# Patient Record
Sex: MALE | Race: WHITE | NOT HISPANIC OR LATINO | Employment: OTHER | URBAN - METROPOLITAN AREA
[De-identification: names, ages, dates, MRNs, and addresses within clinical notes are randomized per-mention and may not be internally consistent; named-entity substitution may affect disease eponyms.]

---

## 2020-01-25 ENCOUNTER — HOSPITAL ENCOUNTER (EMERGENCY)
Facility: HOSPITAL | Age: 60
Discharge: HOME | End: 2020-01-25
Attending: EMERGENCY MEDICINE
Payer: MEDICAID

## 2020-01-25 VITALS
HEART RATE: 97 BPM | HEIGHT: 74 IN | BODY MASS INDEX: 21.15 KG/M2 | TEMPERATURE: 98 F | DIASTOLIC BLOOD PRESSURE: 70 MMHG | OXYGEN SATURATION: 98 % | RESPIRATION RATE: 20 BRPM | SYSTOLIC BLOOD PRESSURE: 129 MMHG | WEIGHT: 164.81 LBS

## 2020-01-25 DIAGNOSIS — M54.50 ACUTE BILATERAL LOW BACK PAIN WITHOUT SCIATICA: Primary | ICD-10-CM

## 2020-01-25 LAB
HCV AB SERPL QL IA: NEGATIVE
HIV 1+2 AB+HIV1 P24 AG SERPL QL IA: NEGATIVE

## 2020-01-25 PROCEDURE — 86703 HIV-1/HIV-2 1 RESULT ANTBDY: CPT

## 2020-01-25 PROCEDURE — 86803 HEPATITIS C AB TEST: CPT

## 2020-01-25 PROCEDURE — 99284 EMERGENCY DEPT VISIT MOD MDM: CPT | Mod: 25

## 2020-01-25 NOTE — ED NOTES
Patient examined, evaluated, and educated on discharge instructions and prescriptions by BETTY Sánchez, without nursing assistance. Patient discharge to lobby by provider.

## 2020-01-25 NOTE — ED PROVIDER NOTES
"   History      Chief Complaint   Patient presents with    Back Pain     pt c/o back pain at "L5-S1, where the fusion was," x4 days       Review of patient's allergies indicates:   Allergen Reactions    Clindamycin Rash        HPI   HPI    1/25/2020, 3:05 PM   History obtained from the patient      History of Present Illness: Eloy Jean is a 59 y.o. male patient who presents to the Emergency Department for low back pain for 4 days.  Denies injury. Symptoms are constant and moderate in severity.  The patient describes the symptoms as achy.  Denies bladder/bowel dysfunction, fever, saddle anesthesia, or focal weakness.   No further complaints or concerns at this time.           PCP: Caterina Sorensen MD       Past Medical History:  No past medical history on file.      Past Surgical History:  No past surgical history on file.        Family History:  No family history on file.        Social History:  Social History     Tobacco Use    Smoking status: Not on file   Substance and Sexual Activity    Alcohol use: Not on file    Drug use: Not on file    Sexual activity: Not on file       ROS   Review of Systems   Constitutional: Negative for chills and fever.   HENT: Negative for sore throat.    Respiratory: Negative for shortness of breath.    Cardiovascular: Negative for chest pain.   Gastrointestinal: Negative for nausea and vomiting.   Genitourinary: Negative for decreased urine volume, difficulty urinating, dysuria and flank pain.   Musculoskeletal: Positive for back pain. Negative for neck stiffness.   Skin: Negative for rash and wound.   Neurological: Negative for weakness and numbness.   Hematological: Does not bruise/bleed easily.   All other systems reviewed and are negative.    Review of Systems    Physical Exam      Initial Vitals [01/25/20 1429]   BP Pulse Resp Temp SpO2   129/70 97 20 98.1 °F (36.7 °C) 98 %      MAP       --         Physical Exam  Vital signs and nursing notes " "reviewed.  Constitutional: Patient is in NAD. Awake and alert. Well-developed and well-nourished.  Head: Atraumatic. Normocephalic.  Eyes: PERRL. EOM intact. Conjunctivae nl. No scleral icterus.  ENT: Mucous membranes are moist. Oropharynx is clear.  Neck: Supple. No JVD. No lymphadenopathy.  No meningismus.  Nontender  Cardiovascular: Regular rate and rhythm. No murmurs, rubs, or gallops. Distal pulses are 2+ and symmetric.  Pulmonary/Chest: No respiratory distress. Clear to auscultation bilaterally. No wheezing, rales, or rhonchi.  Abdominal: Soft. Non-distended. No TTP. No rebound, guarding, or rigidity. Good bowel sounds.  Genitourinary: No CVA tenderness  Musculoskeletal: Moves all extremities. No edema.   Non tender c/t spine.  Lumbar spine with mild bilateral paraspinous ttp, no midline ttp or step.  Skin: Warm and dry.  Neurological: Awake and alert. No acute focal neurological deficits are appreciated.  5/5 x 4 strength.  Strong and equal foot plantar and dorsiflexion.  Psychiatric: Normal affect. Good eye contact. Appropriate in content.      ED Course      Procedures  ED Vital Signs:  Vitals:    01/25/20 1429   BP: 129/70   Pulse: 97   Resp: 20   Temp: 98.1 °F (36.7 °C)   TempSrc: Oral   SpO2: 98%   Weight: 74.7 kg (164 lb 12.7 oz)   Height: 6' 2" (1.88 m)                 Imaging Results:  Imaging Results          X-Ray Lumbar Spine Ap And Lateral (Final result)  Result time 01/25/20 14:48:29    Final result by Daron Winn MD (01/25/20 14:48:29)                 Impression:      No acute radiographic abnormality of the lumbar spine.    Postsurgical changes of an L5-S1 fusion.    L4-5 degenerative changes.      Electronically signed by: Daron Winn  Date:    01/25/2020  Time:    14:48             Narrative:    EXAMINATION:  XR LUMBAR SPINE AP AND LATERAL    CLINICAL HISTORY:  Low back pain, prior surgery, new or progressive sx;    TECHNIQUE:  AP, lateral and spot images were performed of the lumbar " spine.    COMPARISON:  None    FINDINGS:  Five non-rib-bearing lumbar type vertebral bodies.  Postsurgical changes of an L5-S1 posterior fusion.  Postoperative hardware appears intact.  Satisfactory lumbar alignment.  Lumbar vertebral body heights appear maintained.  Mild intervertebral disc height loss at L4-5.  Remaining intervertebral disc spaces appear maintained.  Mild L4-5 facet arthropathy.  No acute fracture.                                   The Emergency Provider reviewed the vital signs and test results, which are outlined above.    ED Discussion         Medication(s) given in the ER:  Medications - No data to display                New Prescriptions    No medications on file          Medical Decision Making        All findings were reviewed with the patient/family in detail.   All remaining questions and concerns were addressed at that time.  Patient/family has been counseled regarding the need for follow-up as well as the indication to return to the emergency room should new or worrisome developments occur.          MDM               Clinical Impression:        ICD-10-CM ICD-9-CM   1. Acute bilateral low back pain without sciatica M54.5 724.2     338.19               Princess Millan PA-C  01/25/20 1509

## 2022-10-10 ENCOUNTER — PATIENT MESSAGE (OUTPATIENT)
Dept: RESEARCH | Facility: HOSPITAL | Age: 62
End: 2022-10-10
Payer: MEDICAID

## 2025-05-01 ENCOUNTER — HOSPITAL ENCOUNTER (EMERGENCY)
Facility: HOSPITAL | Age: 65
Discharge: HOME | End: 2025-05-01
Attending: EMERGENCY MEDICINE
Payer: OTHER GOVERNMENT

## 2025-05-01 VITALS
HEART RATE: 70 BPM | BODY MASS INDEX: 23.05 KG/M2 | OXYGEN SATURATION: 100 % | WEIGHT: 179.63 LBS | RESPIRATION RATE: 18 BRPM | HEIGHT: 74 IN | DIASTOLIC BLOOD PRESSURE: 82 MMHG | SYSTOLIC BLOOD PRESSURE: 149 MMHG | TEMPERATURE: 98 F

## 2025-05-01 DIAGNOSIS — L03.116 CELLULITIS OF LEFT KNEE: Primary | ICD-10-CM

## 2025-05-01 DIAGNOSIS — M25.562 LEFT KNEE PAIN: ICD-10-CM

## 2025-05-01 LAB
ABSOLUTE EOSINOPHIL (OHS): 0.11 K/UL
ABSOLUTE MONOCYTE (OHS): 0.69 K/UL (ref 0.3–1)
ABSOLUTE NEUTROPHIL COUNT (OHS): 6.91 K/UL (ref 1.8–7.7)
ALBUMIN SERPL BCP-MCNC: 2.9 G/DL (ref 3.5–5.2)
ALP SERPL-CCNC: 89 UNIT/L (ref 40–150)
ALT SERPL W/O P-5'-P-CCNC: 16 UNIT/L (ref 10–44)
ANION GAP (OHS): 10 MMOL/L (ref 8–16)
AST SERPL-CCNC: 17 UNIT/L (ref 11–45)
BASOPHILS # BLD AUTO: 0.05 K/UL
BASOPHILS NFR BLD AUTO: 0.6 %
BILIRUB SERPL-MCNC: 0.4 MG/DL (ref 0.1–1)
BUN SERPL-MCNC: 17 MG/DL (ref 8–23)
CALCIUM SERPL-MCNC: 7.9 MG/DL (ref 8.7–10.5)
CHLORIDE SERPL-SCNC: 109 MMOL/L (ref 95–110)
CO2 SERPL-SCNC: 22 MMOL/L (ref 23–29)
CREAT SERPL-MCNC: 0.8 MG/DL (ref 0.5–1.4)
ERYTHROCYTE [DISTWIDTH] IN BLOOD BY AUTOMATED COUNT: 13.5 % (ref 11.5–14.5)
ERYTHROCYTE [SEDIMENTATION RATE] IN BLOOD: 12 MM/HR
GFR SERPLBLD CREATININE-BSD FMLA CKD-EPI: >60 ML/MIN/1.73/M2
GLUCOSE SERPL-MCNC: 106 MG/DL (ref 70–110)
HCT VFR BLD AUTO: 34.5 % (ref 40–54)
HGB BLD-MCNC: 11.5 GM/DL (ref 14–18)
IMM GRANULOCYTES # BLD AUTO: 0.03 K/UL (ref 0–0.04)
IMM GRANULOCYTES NFR BLD AUTO: 0.3 % (ref 0–0.5)
LYMPHOCYTES # BLD AUTO: 0.84 K/UL (ref 1–4.8)
MCH RBC QN AUTO: 29.6 PG (ref 27–31)
MCHC RBC AUTO-ENTMCNC: 33.3 G/DL (ref 32–36)
MCV RBC AUTO: 89 FL (ref 82–98)
NUCLEATED RBC (/100WBC) (OHS): 0 /100 WBC
PLATELET # BLD AUTO: 240 K/UL (ref 150–450)
PMV BLD AUTO: 9.2 FL (ref 9.2–12.9)
POTASSIUM SERPL-SCNC: 3.6 MMOL/L (ref 3.5–5.1)
PROT SERPL-MCNC: 5.5 GM/DL (ref 6–8.4)
RBC # BLD AUTO: 3.89 M/UL (ref 4.6–6.2)
RELATIVE EOSINOPHIL (OHS): 1.3 %
RELATIVE LYMPHOCYTE (OHS): 9.7 % (ref 18–48)
RELATIVE MONOCYTE (OHS): 8 % (ref 4–15)
RELATIVE NEUTROPHIL (OHS): 80.1 % (ref 38–73)
SODIUM SERPL-SCNC: 141 MMOL/L (ref 136–145)
WBC # BLD AUTO: 8.63 K/UL (ref 3.9–12.7)

## 2025-05-01 PROCEDURE — 80053 COMPREHEN METABOLIC PANEL: CPT | Performed by: EMERGENCY MEDICINE

## 2025-05-01 PROCEDURE — 99284 EMERGENCY DEPT VISIT MOD MDM: CPT | Mod: 25

## 2025-05-01 PROCEDURE — 90471 IMMUNIZATION ADMIN: CPT | Performed by: EMERGENCY MEDICINE

## 2025-05-01 PROCEDURE — 90715 TDAP VACCINE 7 YRS/> IM: CPT | Performed by: EMERGENCY MEDICINE

## 2025-05-01 PROCEDURE — 85652 RBC SED RATE AUTOMATED: CPT | Performed by: EMERGENCY MEDICINE

## 2025-05-01 PROCEDURE — 63600175 PHARM REV CODE 636 W HCPCS: Performed by: EMERGENCY MEDICINE

## 2025-05-01 PROCEDURE — 85025 COMPLETE CBC W/AUTO DIFF WBC: CPT | Performed by: EMERGENCY MEDICINE

## 2025-05-01 RX ORDER — DOXYCYCLINE 100 MG/1
100 CAPSULE ORAL 2 TIMES DAILY
Qty: 20 CAPSULE | Refills: 0 | Status: ON HOLD | OUTPATIENT
Start: 2025-05-01 | End: 2025-05-06 | Stop reason: HOSPADM

## 2025-05-01 RX ORDER — SULFAMETHOXAZOLE AND TRIMETHOPRIM 800; 160 MG/1; MG/1
1 TABLET ORAL 2 TIMES DAILY
Qty: 14 TABLET | Refills: 0 | Status: ON HOLD | OUTPATIENT
Start: 2025-05-01 | End: 2025-05-06 | Stop reason: HOSPADM

## 2025-05-01 RX ORDER — OXYCODONE AND ACETAMINOPHEN 5; 325 MG/1; MG/1
1 TABLET ORAL EVERY 6 HOURS PRN
Qty: 12 TABLET | Refills: 0 | Status: ON HOLD | OUTPATIENT
Start: 2025-05-01 | End: 2025-05-06

## 2025-05-01 RX ADMIN — CLOSTRIDIUM TETANI TOXOID ANTIGEN (FORMALDEHYDE INACTIVATED), CORYNEBACTERIUM DIPHTHERIAE TOXOID ANTIGEN (FORMALDEHYDE INACTIVATED), BORDETELLA PERTUSSIS TOXOID ANTIGEN (GLUTARALDEHYDE INACTIVATED), BORDETELLA PERTUSSIS FILAMENTOUS HEMAGGLUTININ ANTIGEN (FORMALDEHYDE INACTIVATED), BORDETELLA PERTUSSIS PERTACTIN ANTIGEN, AND BORDETELLA PERTUSSIS FIMBRIAE 2/3 ANTIGEN 0.5 ML: 5; 2; 2.5; 5; 3; 5 INJECTION, SUSPENSION INTRAMUSCULAR at 05:05
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
## 2025-05-03 ENCOUNTER — HOSPITAL ENCOUNTER (INPATIENT)
Facility: HOSPITAL | Age: 65
LOS: 3 days | Discharge: HOME | DRG: 501 | End: 2025-05-06
Attending: FAMILY MEDICINE | Admitting: STUDENT IN AN ORGANIZED HEALTH CARE EDUCATION/TRAINING PROGRAM
Payer: MEDICARE

## 2025-05-03 ENCOUNTER — ANESTHESIA (OUTPATIENT)
Dept: SURGERY | Facility: HOSPITAL | Age: 65
End: 2025-05-03
Payer: MEDICARE

## 2025-05-03 ENCOUNTER — ANESTHESIA EVENT (OUTPATIENT)
Dept: SURGERY | Facility: HOSPITAL | Age: 65
End: 2025-05-03
Payer: MEDICARE

## 2025-05-03 DIAGNOSIS — Z13.6 SCREENING FOR CARDIOVASCULAR CONDITION: ICD-10-CM

## 2025-05-03 DIAGNOSIS — M25.562 LEFT KNEE PAIN: ICD-10-CM

## 2025-05-03 DIAGNOSIS — R07.9 CHEST PAIN: ICD-10-CM

## 2025-05-03 DIAGNOSIS — L03.116 CELLULITIS OF LEFT KNEE: ICD-10-CM

## 2025-05-03 DIAGNOSIS — M70.42 PREPATELLAR BURSITIS OF LEFT KNEE: Primary | ICD-10-CM

## 2025-05-03 DIAGNOSIS — M86.9: ICD-10-CM

## 2025-05-03 PROBLEM — C67.5: Status: ACTIVE | Noted: 2019-09-18

## 2025-05-03 PROBLEM — M43.00 SPONDYLOLYSIS: Status: ACTIVE | Noted: 2025-05-03

## 2025-05-03 PROBLEM — C67.8 MALIGNANT NEOPLASM OF OVERLAPPING SITES OF BLADDER: Status: ACTIVE | Noted: 2022-01-28

## 2025-05-03 PROBLEM — M54.50 CHRONIC LEFT-SIDED LOW BACK PAIN WITHOUT SCIATICA: Status: ACTIVE | Noted: 2019-09-30

## 2025-05-03 PROBLEM — G89.29 CHRONIC LEFT-SIDED LOW BACK PAIN WITHOUT SCIATICA: Status: ACTIVE | Noted: 2019-09-30

## 2025-05-03 PROBLEM — Z78.9 VEGAN DIET: Status: ACTIVE | Noted: 2019-09-30

## 2025-05-03 PROBLEM — Z87.39 HISTORY OF AVASCULAR NECROSIS OF CAPITAL FEMORAL EPIPHYSIS: Status: ACTIVE | Noted: 2019-09-30

## 2025-05-03 PROBLEM — L40.9 PSORIASIS: Status: ACTIVE | Noted: 2020-04-30

## 2025-05-03 PROBLEM — E78.00 HYPERCHOLESTEROLEMIA: Status: ACTIVE | Noted: 2021-10-12

## 2025-05-03 PROBLEM — E03.9 HYPOTHYROID: Status: ACTIVE | Noted: 2025-05-03

## 2025-05-03 PROBLEM — F31.9 BIPOLAR 1 DISORDER: Status: ACTIVE | Noted: 2019-09-30

## 2025-05-03 PROBLEM — M00.9 ARTHRITIS, SEPTIC, KNEE: Status: ACTIVE | Noted: 2025-05-03

## 2025-05-03 PROBLEM — L21.9 SEBORRHEIC DERMATITIS: Status: ACTIVE | Noted: 2020-06-19

## 2025-05-03 PROBLEM — E78.41 ELEVATED LIPOPROTEIN(A): Status: ACTIVE | Noted: 2019-09-30

## 2025-05-03 PROBLEM — E55.9 HYPOVITAMINOSIS D: Status: ACTIVE | Noted: 2023-01-31

## 2025-05-03 PROBLEM — L73.9 FOLLICULITIS: Status: ACTIVE | Noted: 2022-03-22

## 2025-05-03 PROBLEM — R53.82 CHRONIC FATIGUE: Status: ACTIVE | Noted: 2019-09-30

## 2025-05-03 PROBLEM — R00.1 SINUS BRADYCARDIA BY ELECTROCARDIOGRAM: Status: ACTIVE | Noted: 2019-11-13

## 2025-05-03 LAB
ABSOLUTE EOSINOPHIL (OHS): 0.04 K/UL
ABSOLUTE MONOCYTE (OHS): 0.86 K/UL (ref 0.3–1)
ABSOLUTE NEUTROPHIL COUNT (OHS): 7.11 K/UL (ref 1.8–7.7)
ALBUMIN SERPL BCP-MCNC: 3.1 G/DL (ref 3.5–5.2)
ALP SERPL-CCNC: 106 UNIT/L (ref 40–150)
ALT SERPL W/O P-5'-P-CCNC: 17 UNIT/L (ref 10–44)
ANION GAP (OHS): 9 MMOL/L (ref 8–16)
AST SERPL-CCNC: 19 UNIT/L (ref 11–45)
BASOPHILS # BLD AUTO: 0.04 K/UL
BASOPHILS NFR BLD AUTO: 0.5 %
BILIRUB SERPL-MCNC: 0.3 MG/DL (ref 0.1–1)
BILIRUB UR QL STRIP.AUTO: NEGATIVE
BUN SERPL-MCNC: 19 MG/DL (ref 8–23)
CALCIUM SERPL-MCNC: 8.8 MG/DL (ref 8.7–10.5)
CHLORIDE SERPL-SCNC: 108 MMOL/L (ref 95–110)
CLARITY UR: ABNORMAL
CO2 SERPL-SCNC: 25 MMOL/L (ref 23–29)
COLOR UR AUTO: YELLOW
CREAT SERPL-MCNC: 1.1 MG/DL (ref 0.5–1.4)
ERYTHROCYTE [DISTWIDTH] IN BLOOD BY AUTOMATED COUNT: 13.5 % (ref 11.5–14.5)
GFR SERPLBLD CREATININE-BSD FMLA CKD-EPI: >60 ML/MIN/1.73/M2
GLUCOSE SERPL-MCNC: 87 MG/DL (ref 70–110)
GLUCOSE UR QL STRIP: NEGATIVE
HCT VFR BLD AUTO: 37.3 % (ref 40–54)
HCV AB SERPL QL IA: NEGATIVE
HGB BLD-MCNC: 12 GM/DL (ref 14–18)
HGB UR QL STRIP: NEGATIVE
HIV 1+2 AB+HIV1 P24 AG SERPL QL IA: NEGATIVE
HOLD SPECIMEN: NORMAL
IMM GRANULOCYTES # BLD AUTO: 0.02 K/UL (ref 0–0.04)
IMM GRANULOCYTES NFR BLD AUTO: 0.2 % (ref 0–0.5)
KETONES UR QL STRIP: NEGATIVE
LACTATE SERPL-SCNC: 0.8 MMOL/L (ref 0.5–2.2)
LACTATE SERPL-SCNC: 1.7 MMOL/L (ref 0.5–2.2)
LEUKOCYTE ESTERASE UR QL STRIP: NEGATIVE
LYMPHOCYTES # BLD AUTO: 0.78 K/UL (ref 1–4.8)
MCH RBC QN AUTO: 28.6 PG (ref 27–31)
MCHC RBC AUTO-ENTMCNC: 32.2 G/DL (ref 32–36)
MCV RBC AUTO: 89 FL (ref 82–98)
NITRITE UR QL STRIP: NEGATIVE
NUCLEATED RBC (/100WBC) (OHS): 0 /100 WBC
OHS QRS DURATION: 76 MS
OHS QTC CALCULATION: 418 MS
PH UR STRIP: 7 [PH]
PLATELET # BLD AUTO: 289 K/UL (ref 150–450)
PMV BLD AUTO: 9 FL (ref 9.2–12.9)
POTASSIUM SERPL-SCNC: 3.5 MMOL/L (ref 3.5–5.1)
PROCALCITONIN SERPL-MCNC: 0.07 NG/ML
PROT SERPL-MCNC: 6.1 GM/DL (ref 6–8.4)
PROT UR QL STRIP: NEGATIVE
RBC # BLD AUTO: 4.19 M/UL (ref 4.6–6.2)
RELATIVE EOSINOPHIL (OHS): 0.5 %
RELATIVE LYMPHOCYTE (OHS): 8.8 % (ref 18–48)
RELATIVE MONOCYTE (OHS): 9.7 % (ref 4–15)
RELATIVE NEUTROPHIL (OHS): 80.3 % (ref 38–73)
SODIUM SERPL-SCNC: 142 MMOL/L (ref 136–145)
SP GR UR STRIP: 1.02
TSH SERPL-ACNC: 1.14 UIU/ML (ref 0.4–4)
UROBILINOGEN UR STRIP-ACNC: NEGATIVE EU/DL
WBC # BLD AUTO: 8.85 K/UL (ref 3.9–12.7)

## 2025-05-03 PROCEDURE — 37000009 HC ANESTHESIA EA ADD 15 MINS: Performed by: ORTHOPAEDIC SURGERY

## 2025-05-03 PROCEDURE — 87116 MYCOBACTERIA CULTURE: CPT | Performed by: ORTHOPAEDIC SURGERY

## 2025-05-03 PROCEDURE — 87206 SMEAR FLUORESCENT/ACID STAI: CPT | Performed by: ORTHOPAEDIC SURGERY

## 2025-05-03 PROCEDURE — 25000003 PHARM REV CODE 250: Performed by: FAMILY MEDICINE

## 2025-05-03 PROCEDURE — 21400001 HC TELEMETRY ROOM

## 2025-05-03 PROCEDURE — 84443 ASSAY THYROID STIM HORMONE: CPT | Performed by: NURSE PRACTITIONER

## 2025-05-03 PROCEDURE — 25000003 PHARM REV CODE 250: Performed by: STUDENT IN AN ORGANIZED HEALTH CARE EDUCATION/TRAINING PROGRAM

## 2025-05-03 PROCEDURE — 94761 N-INVAS EAR/PLS OXIMETRY MLT: CPT

## 2025-05-03 PROCEDURE — 36000707: Performed by: ORTHOPAEDIC SURGERY

## 2025-05-03 PROCEDURE — 87205 SMEAR GRAM STAIN: CPT | Performed by: ORTHOPAEDIC SURGERY

## 2025-05-03 PROCEDURE — 93010 ELECTROCARDIOGRAM REPORT: CPT | Mod: ,,, | Performed by: INTERNAL MEDICINE

## 2025-05-03 PROCEDURE — 27200651 HC AIRWAY, LMA: Performed by: ANESTHESIOLOGY

## 2025-05-03 PROCEDURE — 83605 ASSAY OF LACTIC ACID: CPT | Performed by: STUDENT IN AN ORGANIZED HEALTH CARE EDUCATION/TRAINING PROGRAM

## 2025-05-03 PROCEDURE — 87040 BLOOD CULTURE FOR BACTERIA: CPT | Performed by: ORTHOPAEDIC SURGERY

## 2025-05-03 PROCEDURE — 99285 EMERGENCY DEPT VISIT HI MDM: CPT | Mod: 25

## 2025-05-03 PROCEDURE — 25000003 PHARM REV CODE 250: Performed by: NURSE PRACTITIONER

## 2025-05-03 PROCEDURE — 84145 PROCALCITONIN (PCT): CPT | Performed by: FAMILY MEDICINE

## 2025-05-03 PROCEDURE — 63600175 PHARM REV CODE 636 W HCPCS: Performed by: ANESTHESIOLOGY

## 2025-05-03 PROCEDURE — 86803 HEPATITIS C AB TEST: CPT | Performed by: EMERGENCY MEDICINE

## 2025-05-03 PROCEDURE — 83605 ASSAY OF LACTIC ACID: CPT | Performed by: FAMILY MEDICINE

## 2025-05-03 PROCEDURE — 63600175 PHARM REV CODE 636 W HCPCS: Performed by: NURSE ANESTHETIST, CERTIFIED REGISTERED

## 2025-05-03 PROCEDURE — 81003 URINALYSIS AUTO W/O SCOPE: CPT | Performed by: FAMILY MEDICINE

## 2025-05-03 PROCEDURE — 63600175 PHARM REV CODE 636 W HCPCS: Performed by: STUDENT IN AN ORGANIZED HEALTH CARE EDUCATION/TRAINING PROGRAM

## 2025-05-03 PROCEDURE — 63600175 PHARM REV CODE 636 W HCPCS: Performed by: ORTHOPAEDIC SURGERY

## 2025-05-03 PROCEDURE — 27340 REMOVAL OF KNEECAP BURSA: CPT | Mod: LT,,, | Performed by: ORTHOPAEDIC SURGERY

## 2025-05-03 PROCEDURE — 87389 HIV-1 AG W/HIV-1&-2 AB AG IA: CPT | Performed by: EMERGENCY MEDICINE

## 2025-05-03 PROCEDURE — 63600175 PHARM REV CODE 636 W HCPCS: Performed by: FAMILY MEDICINE

## 2025-05-03 PROCEDURE — 96360 HYDRATION IV INFUSION INIT: CPT

## 2025-05-03 PROCEDURE — 87102 FUNGUS ISOLATION CULTURE: CPT | Performed by: ORTHOPAEDIC SURGERY

## 2025-05-03 PROCEDURE — 20610 DRAIN/INJ JOINT/BURSA W/O US: CPT | Mod: XS,LT,, | Performed by: ORTHOPAEDIC SURGERY

## 2025-05-03 PROCEDURE — 36415 COLL VENOUS BLD VENIPUNCTURE: CPT | Performed by: STUDENT IN AN ORGANIZED HEALTH CARE EDUCATION/TRAINING PROGRAM

## 2025-05-03 PROCEDURE — 87075 CULTR BACTERIA EXCEPT BLOOD: CPT | Performed by: ORTHOPAEDIC SURGERY

## 2025-05-03 PROCEDURE — 37000008 HC ANESTHESIA 1ST 15 MINUTES: Performed by: ORTHOPAEDIC SURGERY

## 2025-05-03 PROCEDURE — 0S9D3ZX DRAINAGE OF LEFT KNEE JOINT, PERCUTANEOUS APPROACH, DIAGNOSTIC: ICD-10-PCS | Performed by: ORTHOPAEDIC SURGERY

## 2025-05-03 PROCEDURE — 93005 ELECTROCARDIOGRAM TRACING: CPT

## 2025-05-03 PROCEDURE — 82040 ASSAY OF SERUM ALBUMIN: CPT | Performed by: FAMILY MEDICINE

## 2025-05-03 PROCEDURE — 71000033 HC RECOVERY, INTIAL HOUR: Performed by: ORTHOPAEDIC SURGERY

## 2025-05-03 PROCEDURE — 0MBP0ZZ EXCISION OF LEFT KNEE BURSA AND LIGAMENT, OPEN APPROACH: ICD-10-PCS | Performed by: ORTHOPAEDIC SURGERY

## 2025-05-03 PROCEDURE — 85025 COMPLETE CBC W/AUTO DIFF WBC: CPT | Performed by: FAMILY MEDICINE

## 2025-05-03 PROCEDURE — 25000003 PHARM REV CODE 250: Performed by: ORTHOPAEDIC SURGERY

## 2025-05-03 PROCEDURE — 36000706: Performed by: ORTHOPAEDIC SURGERY

## 2025-05-03 PROCEDURE — 87070 CULTURE OTHR SPECIMN AEROBIC: CPT | Performed by: ORTHOPAEDIC SURGERY

## 2025-05-03 RX ORDER — ONDANSETRON HYDROCHLORIDE 2 MG/ML
4 INJECTION, SOLUTION INTRAVENOUS DAILY PRN
Status: DISCONTINUED | OUTPATIENT
Start: 2025-05-03 | End: 2025-05-06 | Stop reason: HOSPADM

## 2025-05-03 RX ORDER — GLUCAGON 1 MG
1 KIT INJECTION
Status: DISCONTINUED | OUTPATIENT
Start: 2025-05-03 | End: 2025-05-06 | Stop reason: HOSPADM

## 2025-05-03 RX ORDER — CHLORHEXIDINE GLUCONATE ORAL RINSE 1.2 MG/ML
10 SOLUTION DENTAL 2 TIMES DAILY
Status: DISCONTINUED | OUTPATIENT
Start: 2025-05-03 | End: 2025-05-06 | Stop reason: HOSPADM

## 2025-05-03 RX ORDER — DIAZEPAM 10 MG/1
5-10 TABLET ORAL NIGHTLY PRN
COMMUNITY
Start: 2025-02-23

## 2025-05-03 RX ORDER — POLYETHYLENE GLYCOL 3350 17 G/17G
17 POWDER, FOR SOLUTION ORAL DAILY
Status: DISCONTINUED | OUTPATIENT
Start: 2025-05-04 | End: 2025-05-06 | Stop reason: HOSPADM

## 2025-05-03 RX ORDER — DIAZEPAM 5 MG/1
5 TABLET ORAL 2 TIMES DAILY PRN
Status: DISCONTINUED | OUTPATIENT
Start: 2025-05-03 | End: 2025-05-06 | Stop reason: HOSPADM

## 2025-05-03 RX ORDER — CEFEPIME HYDROCHLORIDE 1 G/1
1 INJECTION, POWDER, FOR SOLUTION INTRAMUSCULAR; INTRAVENOUS
Status: DISCONTINUED | OUTPATIENT
Start: 2025-05-03 | End: 2025-05-05

## 2025-05-03 RX ORDER — TAMSULOSIN HYDROCHLORIDE 0.4 MG/1
CAPSULE ORAL
COMMUNITY

## 2025-05-03 RX ORDER — SODIUM CHLORIDE, SODIUM LACTATE, POTASSIUM CHLORIDE, CALCIUM CHLORIDE 600; 310; 30; 20 MG/100ML; MG/100ML; MG/100ML; MG/100ML
INJECTION, SOLUTION INTRAVENOUS CONTINUOUS PRN
Status: DISCONTINUED | OUTPATIENT
Start: 2025-05-03 | End: 2025-05-03

## 2025-05-03 RX ORDER — HYOSCYAMINE SULFATE 0.12 MG/1
0.12 TABLET SUBLINGUAL EVERY 6 HOURS
COMMUNITY
Start: 2024-12-06 | End: 2025-05-03

## 2025-05-03 RX ORDER — FENTANYL CITRATE 50 UG/ML
25 INJECTION, SOLUTION INTRAMUSCULAR; INTRAVENOUS EVERY 5 MIN PRN
Status: DISCONTINUED | OUTPATIENT
Start: 2025-05-03 | End: 2025-05-03 | Stop reason: HOSPADM

## 2025-05-03 RX ORDER — HYDRALAZINE HYDROCHLORIDE 20 MG/ML
10 INJECTION INTRAMUSCULAR; INTRAVENOUS ONCE
Status: DISCONTINUED | OUTPATIENT
Start: 2025-05-03 | End: 2025-05-06 | Stop reason: HOSPADM

## 2025-05-03 RX ORDER — HYDROMORPHONE HYDROCHLORIDE 2 MG/ML
0.2 INJECTION, SOLUTION INTRAMUSCULAR; INTRAVENOUS; SUBCUTANEOUS EVERY 5 MIN PRN
Status: DISCONTINUED | OUTPATIENT
Start: 2025-05-03 | End: 2025-05-06 | Stop reason: HOSPADM

## 2025-05-03 RX ORDER — HYDROCODONE BITARTRATE AND ACETAMINOPHEN 10; 325 MG/1; MG/1
TABLET ORAL
COMMUNITY
End: 2025-05-03

## 2025-05-03 RX ORDER — MIDAZOLAM HYDROCHLORIDE 1 MG/ML
INJECTION INTRAMUSCULAR; INTRAVENOUS
Status: DISCONTINUED | OUTPATIENT
Start: 2025-05-03 | End: 2025-05-03

## 2025-05-03 RX ORDER — ONDANSETRON HYDROCHLORIDE 2 MG/ML
INJECTION, SOLUTION INTRAVENOUS
Status: DISCONTINUED | OUTPATIENT
Start: 2025-05-03 | End: 2025-05-03

## 2025-05-03 RX ORDER — DEXTROAMPHETAMINE SACCHARATE, AMPHETAMINE ASPARTATE, DEXTROAMPHETAMINE SULFATE AND AMPHETAMINE SULFATE 5; 5; 5; 5 MG/1; MG/1; MG/1; MG/1
TABLET ORAL
COMMUNITY
Start: 2025-03-04

## 2025-05-03 RX ORDER — LIDOCAINE HYDROCHLORIDE 10 MG/ML
INJECTION, SOLUTION EPIDURAL; INFILTRATION; INTRACAUDAL; PERINEURAL
Status: DISCONTINUED | OUTPATIENT
Start: 2025-05-03 | End: 2025-05-03

## 2025-05-03 RX ORDER — SODIUM CHLORIDE 0.9 % (FLUSH) 0.9 %
10 SYRINGE (ML) INJECTION
Status: DISCONTINUED | OUTPATIENT
Start: 2025-05-03 | End: 2025-05-06 | Stop reason: HOSPADM

## 2025-05-03 RX ORDER — CEFEPIME HYDROCHLORIDE 1 G/1
1 INJECTION, POWDER, FOR SOLUTION INTRAMUSCULAR; INTRAVENOUS
Status: DISCONTINUED | OUTPATIENT
Start: 2025-05-03 | End: 2025-05-03

## 2025-05-03 RX ORDER — LITHIUM CARBONATE 300 MG
600 TABLET ORAL 2 TIMES DAILY
COMMUNITY
Start: 2024-11-11

## 2025-05-03 RX ORDER — SODIUM CHLORIDE 0.9 % (FLUSH) 0.9 %
10 SYRINGE (ML) INJECTION EVERY 12 HOURS PRN
Status: DISCONTINUED | OUTPATIENT
Start: 2025-05-03 | End: 2025-05-06 | Stop reason: HOSPADM

## 2025-05-03 RX ORDER — IBUPROFEN 200 MG
24 TABLET ORAL
Status: DISCONTINUED | OUTPATIENT
Start: 2025-05-03 | End: 2025-05-06 | Stop reason: HOSPADM

## 2025-05-03 RX ORDER — PROCHLORPERAZINE EDISYLATE 5 MG/ML
5 INJECTION INTRAMUSCULAR; INTRAVENOUS EVERY 6 HOURS PRN
Status: DISCONTINUED | OUTPATIENT
Start: 2025-05-03 | End: 2025-05-06 | Stop reason: HOSPADM

## 2025-05-03 RX ORDER — ONDANSETRON HYDROCHLORIDE 2 MG/ML
4 INJECTION, SOLUTION INTRAVENOUS EVERY 8 HOURS PRN
Status: DISCONTINUED | OUTPATIENT
Start: 2025-05-03 | End: 2025-05-06 | Stop reason: HOSPADM

## 2025-05-03 RX ORDER — OXYCODONE AND ACETAMINOPHEN 5; 325 MG/1; MG/1
1 TABLET ORAL EVERY 4 HOURS PRN
Refills: 0 | Status: DISCONTINUED | OUTPATIENT
Start: 2025-05-03 | End: 2025-05-06 | Stop reason: HOSPADM

## 2025-05-03 RX ORDER — TAMSULOSIN HYDROCHLORIDE 0.4 MG/1
0.4 CAPSULE ORAL DAILY
Status: DISCONTINUED | OUTPATIENT
Start: 2025-05-04 | End: 2025-05-06 | Stop reason: HOSPADM

## 2025-05-03 RX ORDER — ALUMINUM HYDROXIDE, MAGNESIUM HYDROXIDE, AND SIMETHICONE 1200; 120; 1200 MG/30ML; MG/30ML; MG/30ML
30 SUSPENSION ORAL 4 TIMES DAILY PRN
Status: DISCONTINUED | OUTPATIENT
Start: 2025-05-03 | End: 2025-05-06 | Stop reason: HOSPADM

## 2025-05-03 RX ORDER — FENTANYL CITRATE 50 UG/ML
INJECTION, SOLUTION INTRAMUSCULAR; INTRAVENOUS
Status: DISCONTINUED | OUTPATIENT
Start: 2025-05-03 | End: 2025-05-03

## 2025-05-03 RX ORDER — NALOXONE HCL 0.4 MG/ML
0.02 VIAL (ML) INJECTION
Status: DISCONTINUED | OUTPATIENT
Start: 2025-05-03 | End: 2025-05-06 | Stop reason: HOSPADM

## 2025-05-03 RX ORDER — TALC
6 POWDER (GRAM) TOPICAL NIGHTLY PRN
Status: DISCONTINUED | OUTPATIENT
Start: 2025-05-03 | End: 2025-05-03

## 2025-05-03 RX ORDER — PROPOFOL 10 MG/ML
VIAL (ML) INTRAVENOUS
Status: DISCONTINUED | OUTPATIENT
Start: 2025-05-03 | End: 2025-05-03

## 2025-05-03 RX ORDER — ACETAMINOPHEN 325 MG/1
650 TABLET ORAL EVERY 4 HOURS PRN
Status: DISCONTINUED | OUTPATIENT
Start: 2025-05-03 | End: 2025-05-06 | Stop reason: HOSPADM

## 2025-05-03 RX ORDER — DIPHENHYDRAMINE HYDROCHLORIDE 50 MG/ML
25 INJECTION, SOLUTION INTRAMUSCULAR; INTRAVENOUS EVERY 6 HOURS PRN
Status: DISCONTINUED | OUTPATIENT
Start: 2025-05-03 | End: 2025-05-06 | Stop reason: HOSPADM

## 2025-05-03 RX ORDER — IBUPROFEN 200 MG
16 TABLET ORAL
Status: DISCONTINUED | OUTPATIENT
Start: 2025-05-03 | End: 2025-05-06 | Stop reason: HOSPADM

## 2025-05-03 RX ADMIN — LIDOCAINE HYDROCHLORIDE 50 MG: 10 SOLUTION INTRAVENOUS at 05:05

## 2025-05-03 RX ADMIN — HYDROMORPHONE HYDROCHLORIDE 0.2 MG: 2 INJECTION INTRAMUSCULAR; INTRAVENOUS; SUBCUTANEOUS at 06:05

## 2025-05-03 RX ADMIN — HYDROMORPHONE HYDROCHLORIDE 0.2 MG: 2 INJECTION INTRAMUSCULAR; INTRAVENOUS; SUBCUTANEOUS at 07:05

## 2025-05-03 RX ADMIN — CHLORHEXIDINE GLUCONATE 0.12% ORAL RINSE 10 ML: 1.2 LIQUID ORAL at 09:05

## 2025-05-03 RX ADMIN — OXYCODONE HYDROCHLORIDE AND ACETAMINOPHEN 1 TABLET: 5; 325 TABLET ORAL at 06:05

## 2025-05-03 RX ADMIN — VANCOMYCIN HYDROCHLORIDE 1500 MG: 1.5 INJECTION, POWDER, LYOPHILIZED, FOR SOLUTION INTRAVENOUS at 03:05

## 2025-05-03 RX ADMIN — SODIUM CHLORIDE, SODIUM LACTATE, POTASSIUM CHLORIDE, AND CALCIUM CHLORIDE: 600; 310; 30; 20 INJECTION, SOLUTION INTRAVENOUS at 05:05

## 2025-05-03 RX ADMIN — FENTANYL CITRATE 100 MCG: 50 INJECTION, SOLUTION INTRAMUSCULAR; INTRAVENOUS at 05:05

## 2025-05-03 RX ADMIN — PROPOFOL 80 MG: 10 INJECTION, EMULSION INTRAVENOUS at 05:05

## 2025-05-03 RX ADMIN — CEFEPIME 1 G: 1 INJECTION, POWDER, FOR SOLUTION INTRAMUSCULAR; INTRAVENOUS at 03:05

## 2025-05-03 RX ADMIN — PIPERACILLIN AND TAZOBACTAM 4.5 G: 4; .5 INJECTION, POWDER, LYOPHILIZED, FOR SOLUTION INTRAVENOUS; PARENTERAL at 12:05

## 2025-05-03 RX ADMIN — MIDAZOLAM HYDROCHLORIDE 2 MG: 1 INJECTION, SOLUTION INTRAMUSCULAR; INTRAVENOUS at 05:05

## 2025-05-03 RX ADMIN — SODIUM CHLORIDE 1000 ML: 9 INJECTION, SOLUTION INTRAVENOUS at 10:05

## 2025-05-03 RX ADMIN — CEFEPIME 1 G: 1 INJECTION, POWDER, FOR SOLUTION INTRAMUSCULAR; INTRAVENOUS at 09:05

## 2025-05-03 RX ADMIN — ONDANSETRON 4 MG: 2 INJECTION INTRAMUSCULAR; INTRAVENOUS at 05:05

## 2025-05-03 NOTE — NURSING TRANSFER
"  Nursing Transfer Note      5/3/2025   1:26 PM    Nurse giving handoff:Elvia   Nurse receiving handoff:crystral     Reason patient is being transferred: observation for septic L knee/ cellulitis     Transfer From: ED    Transfer via stretcher    Transfer with cardiac monitoring    Transported by elvia    Transfer Vital Signs:  /82   Pulse 76   Temp 98 °F (36.7 °C)   Resp 18   Ht 6' 2" (1.88 m)   Wt 80 kg (176 lb 5.9 oz)   SpO2 96%   BMI 22.64 kg/m²       Telemetry: Box Number [id number]  Order for Tele Monitor? Yes     patient belongings transferred with patient: Yes helmet and clothes, lap top and cell phone    Chart send with patient: Yes      Upon arrival to floor: cardiac monitor applied, patient oriented to room, call bell in reach, and bed in lowest position. Bed alarm refused   "
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
## 2025-05-03 NOTE — ASSESSMENT & PLAN NOTE
Follows with psychiatry  Managed with lithium, patient reports does not take this medication every day.    --continue Valium p.r.n.

## 2025-05-03 NOTE — ASSESSMENT & PLAN NOTE
Previous history of hypothyroid, treated with levothyroxine.  Patient reports no longer taking this medication.    --TSH
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
## 2025-05-03 NOTE — PROGRESS NOTES
Pharmacist Renal Dose Adjustment Note    Eloy Jean is a 65 y.o. male being treated with the medication Cefepime    Patient Data:    Vital Signs (Most Recent):  Temp: 98 °F (36.7 °C) (05/03/25 1307)  Pulse: 76 (05/03/25 1307)  Resp: 18 (05/03/25 1307)  BP: 132/82 (05/03/25 1307)  SpO2: 96 % (05/03/25 1307) Vital Signs (72h Range):  Temp:  [98 °F (36.7 °C)-99 °F (37.2 °C)]   Pulse:  []   Resp:  [18-20]   BP: (130-153)/(63-82)   SpO2:  [96 %-100 %]      Recent Labs   Lab 05/01/25  0506 05/03/25  1054   CREATININE 0.8 1.1     Serum creatinine: 1.1 mg/dL 05/03/25 1054  Estimated creatinine clearance: 75.8 mL/min    Medication:cefepime dose: 1 gram frequency every 12 hours will be changed to medication:cefepime dose:1 gram frequency:every 6 hours    Pharmacist's Name: Radha Andrade  Pharmacist's Extension: [phone]

## 2025-05-03 NOTE — PROGRESS NOTES
"Pharmacokinetic Initial Assessment: IV Vancomycin    Assessment/Plan:    Initiate intravenous vancomycin with loading dose of 1500 mg once with subsequent doses when random concentrations are less than 20 mcg/mL  Desired empiric serum trough concentration is 15 to 20 mcg/mL  Draw vancomycin random level on 5/4 at 0600.  Pharmacy will continue to follow and monitor vancomycin.      Please contact pharmacy at extension [phone]  with any questions regarding this assessment.     Thank you for the consult,   Radha Andrade       Patient brief summary:  Eloy Jean is a 65 y.o. male initiated on antimicrobial therapy with IV Vancomycin for treatment of suspected bone/joint infection    Drug Allergies:   Review of patient's allergies indicates:   Allergen Reactions    Clindamycin Rash       Actual Body Weight:   80 kg    Renal Function:   Estimated Creatinine Clearance: 75.8 mL/min (based on SCr of 1.1 mg/dL).,     Dialysis Method (if applicable):  N/A    CBC (last 72 hours):  Recent Labs   Lab Result Units 05/01/25  0506 05/03/25  1054   WBC K/uL 8.63 8.85   HGB gm/dL 11.5* 12.0*   HCT % 34.5* 37.3*   Platelet Count K/uL 240 289   Lymph % % 9.7* 8.8*   Mono % % 8.0 9.7   Eos % % 1.3 0.5   Basophil % % 0.6 0.5       Metabolic Panel (last 72 hours):  Recent Labs   Lab Result Units 05/01/25  0506 05/03/25  1054 05/03/25  1349   Sodium mmol/L 141 142  --    Potassium mmol/L 3.6 3.5  --    Chloride mmol/L 109 108  --    CO2 mmol/L 22* 25  --    Glucose mg/dL 106 87  --    Glucose, UA   --   --  Negative   BUN mg/dL 17 19  --    Creatinine mg/dL 0.8 1.1  --    Albumin g/dL 2.9* 3.1*  --    Bilirubin Total mg/dL 0.4 0.3  --    ALP unit/L 89 106  --    AST unit/L 17 19  --    ALT unit/L 16 17  --        Drug levels (last 3 results):  No results for input(s): "VANCOMYCINRA", "VANCORANDOM", "VANCOMYCINPE", "VANCOPEAK", "VANCOMYCINTR", "VANCOTROUGH" in the last 72 hours.    Microbiologic Results:  Microbiology Results (last 7 " days)       Procedure Component Value Units Date/Time    Blood culture x two cultures. Draw prior to antibiotics. [[id number]] Collected: 05/03/25 1054    Order Status: Sent Specimen: Blood from Peripheral, Antecubital, Left Updated: 05/03/25 1643    Blood culture x two cultures. Draw prior to antibiotics. [[id number]] Collected: 05/03/25 1057    Order Status: Sent Specimen: Blood from Peripheral, Hand, Right Updated: 05/03/25 1112

## 2025-05-03 NOTE — TRANSFER OF CARE
"Anesthesia Transfer of Care Note    Patient: Eloy Jean    Procedure(s) Performed: Procedure(s) (LRB):  IRRIGATION AND DEBRIDEMENT, LOWER EXTREMITY (Left)    Patient location: PACU    Anesthesia Type: general    Transport from OR: Transported from OR on room air with adequate spontaneous ventilation    Post pain: adequate analgesia    Post assessment: no apparent anesthetic complications and tolerated procedure well    Post vital signs: stable    Level of consciousness: awake    Nausea/Vomiting: no nausea/vomiting    Complications: none    Transfer of care protocol was followed      Last vitals: Visit Vitals  BP (!) 123/58 (BP Location: Left arm, Patient Position: Lying)   Pulse 75   Temp 36.6 °C (97.9 °F) (Oral)   Resp 18   Ht 6' 2" (1.88 m)   Wt 80 kg (176 lb 5.9 oz)   SpO2 98%   BMI 22.64 kg/m²     "

## 2025-05-03 NOTE — PLAN OF CARE
A206/A206 HUMADavid Jean is a 65 y.o.male admitted on 5/3/2025 for Arthritis, septic, knee   Code Status: Full Code MRN: [id number]   Review of patient's allergies indicates:   Allergen Reactions    Clindamycin Rash     Past Medical History:   Diagnosis Date    Cancer     bladder cancer      PRN meds    acetaminophen, 650 mg, Q4H PRN  aluminum-magnesium hydroxide-simethicone, 30 mL, QID PRN  dextrose 50%, 12.5 g, PRN  dextrose 50%, 25 g, PRN  diazePAM, 5 mg, BID PRN  glucagon (human recombinant), 1 mg, PRN  glucose, 16 g, PRN  glucose, 24 g, PRN  naloxone, 0.02 mg, PRN  ondansetron, 4 mg, Q8H PRN  oxyCODONE-acetaminophen, 1 tablet, Q4H PRN  prochlorperazine, 5 mg, Q6H PRN  sodium chloride 0.9%, 10 mL, Q12H PRN      Chart check completed. Will continue plan of care.     NPO  Ortho following   IV zosyn        Orientation: oriented x 4  [place] Coma Scale Score: 15       Rhythm: normal sinus rhythm    Cardiac/Telemetry Box Number: [id number]  VTE Core Measure: Patient refused interventions (refused SCD per cellulitis of Left knee and leg) Last Bowel Movement: 05/01/25  Diet NPO     Karan Score: 20  Fall Risk Score: 13  Accucheck []   Freq?      Lines/Drains/Airways       Peripheral Intravenous Line  Duration                  Peripheral IV - Single Lumen 05/03/25 1051 20 G Left Antecubital <1 day
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
## 2025-05-03 NOTE — ANESTHESIA PROCEDURE NOTES
Intubation    Date/Time: 5/3/2025 5:49 PM    Performed by: Jose Daniel CRNA  Authorized by: Mayra Domingo MD    Intubation:     Induction:  Intravenous    Intubated:  Postinduction    Mask Ventilation:  Not attempted    Attempts:  1    Attempted By:  CRNA    Difficult Airway Encountered?: No      Complications:  None    Airway Device:  Supraglottic airway/LMA    Airway Device Size:  4.0    Style/Cuff Inflation:  Cuffed (inflated to minimal occlusive pressure)    Secured at:  The lips    Placement Verified By:  Capnometry    Complicating Factors:  None    Findings Post-Intubation:  BS equal bilateral and atraumatic/condition of teeth unchanged
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
## 2025-05-03 NOTE — SUBJECTIVE & OBJECTIVE
Past Medical History:   Diagnosis Date    Cancer     bladder cancer       Past Surgical History:   Procedure Laterality Date    BACK SURGERY      CYSTOSCOPY      Yearly to monitor bladder cancer    FRACTURE SURGERY      previous hip fx       Review of patient's allergies indicates:   Allergen Reactions    Clindamycin Rash       No current facility-administered medications on file prior to encounter.     Current Outpatient Medications on File Prior to Encounter   Medication Sig    dextroamphetamine-amphetamine (ADDERALL) 20 mg tablet TK 1 T PO TID    diazePAM (VALIUM) 10 MG Tab Take 5-10 mg by mouth nightly as needed.    lithium (LITHOTAB) 300 mg tablet Take 600 mg by mouth 2 (two) times daily.    [DISCONTINUED] hyoscyamine 0.125 mg Subl Place 0.125 mg under the tongue every 6 (six) hours.    doxycycline (VIBRAMYCIN) 100 MG Cap Take 1 capsule (100 mg total) by mouth 2 (two) times daily. for 10 days    oxyCODONE-acetaminophen (PERCOCET) 5-325 mg per tablet Take 1 tablet by mouth every 6 (six) hours as needed for Pain.    sulfamethoxazole-trimethoprim 800-160mg (BACTRIM DS) 800-160 mg Tab Take 1 tablet by mouth 2 (two) times daily. for 7 days    tamsulosin (FLOMAX) 0.4 mg Cap     [DISCONTINUED] HYDROcodone-acetaminophen (NORCO)  mg per tablet      Family History    None       Tobacco Use    Smoking status: Never    Smokeless tobacco: Never   Substance and Sexual Activity    Alcohol use: Never    Drug use: Never    Sexual activity: Not Currently     Review of Systems   Constitutional:  Positive for fatigue.   Musculoskeletal:  Positive for gait problem and joint swelling.   Skin:  Positive for wound (left knee).   All other systems reviewed and are negative.    Objective:     Vital Signs (Most Recent):  Temp: 99 °F (37.2 °C) (05/03/25 1012)  Pulse: 101 (05/03/25 1012)  Resp: 20 (05/03/25 1012)  BP: 130/63 (05/03/25 1012)  SpO2: 99 % (05/03/25 1012) Vital Signs (24h Range):  Temp:  [99 °F (37.2 °C)] 99 °F (37.2  °C)  Pulse:  [101] 101  Resp:  [20] 20  SpO2:  [99 %] 99 %  BP: (130)/(63) 130/63     Weight: 79.6 kg (175 lb 6.4 oz)  Body mass index is 22.52 kg/m².     Physical Exam  Vitals and nursing note reviewed.   Constitutional:       General: He is not in acute distress.     Appearance: He is normal weight. He is ill-appearing. He is not diaphoretic.   HENT:      Head: Normocephalic and atraumatic.      Right Ear: Hearing and external ear normal.      Left Ear: Hearing and external ear normal.      Nose: Nose normal. No mucosal edema or rhinorrhea.      Mouth/Throat:      Pharynx: Uvula midline.   Eyes:      General:         Right eye: No discharge.         Left eye: No discharge.      Conjunctiva/sclera: Conjunctivae normal.      Right eye: No chemosis.     Left eye: No chemosis.     Pupils: Pupils are equal, round, and reactive to light.   Neck:      Thyroid: No thyroid mass or thyromegaly.      Trachea: Trachea normal.   Cardiovascular:      Rate and Rhythm: Normal rate and regular rhythm.      Pulses:           Dorsalis pedis pulses are 2+ on the right side and 2+ on the left side.      Heart sounds: Normal heart sounds. No murmur heard.  Pulmonary:      Effort: Pulmonary effort is normal. No respiratory distress.      Breath sounds: Normal breath sounds. No decreased breath sounds or wheezing.   Abdominal:      General: Bowel sounds are normal. There is no distension.      Palpations: Abdomen is soft.      Tenderness: There is no abdominal tenderness.   Musculoskeletal:      Cervical back: Normal range of motion and neck supple.      Left knee: Swelling and erythema present. Decreased range of motion. Tenderness present.   Lymphadenopathy:      Cervical: No cervical adenopathy.      Upper Body:      Right upper body: No supraclavicular adenopathy.      Left upper body: No supraclavicular adenopathy.   Skin:     General: Skin is warm and dry.      Capillary Refill: Capillary refill takes less than 2 seconds.       Findings: No rash.          Neurological:      Mental Status: He is alert and oriented to person, place, and time.   Psychiatric:         Mood and Affect: Mood is not anxious.         Speech: Speech normal.         Behavior: Behavior normal.         Thought Content: Thought content normal.         Judgment: Judgment normal.              CRANIAL NERVES     CN III, IV, VI   Pupils are equal, round, and reactive to light.       Significant Labs: All pertinent labs within the past 24 hours have been reviewed.  CBC:   Recent Labs   Lab 05/03/25  1054   WBC 8.85   HGB 12.0*   HCT 37.3*        CMP:   Recent Labs   Lab 05/03/25  1054      K 3.5      CO2 25   GLU 87   BUN 19   CREATININE 1.1   CALCIUM 8.8   PROT 6.1   ALBUMIN 3.1*   BILITOT 0.3   ALKPHOS 106   AST 19   ALT 17   ANIONGAP 9     Lactic Acid:   Recent Labs   Lab 05/03/25  1054   LACTATE 1.7       Significant Imaging: I have reviewed all pertinent imaging results/findings within the past 24 hours.
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
## 2025-05-03 NOTE — ANESTHESIA PREPROCEDURE EVALUATION
05/03/2025  Eloy Jean is a 65 y.o., male.    Past Medical History:   Diagnosis Date    Cancer     bladder cancer     Past Surgical History:   Procedure Laterality Date    BACK SURGERY      CYSTOSCOPY      Yearly to monitor bladder cancer    FRACTURE SURGERY      previous hip fx   Medications Ordered Prior to Encounter[1]  Review of patient's allergies indicates:   Allergen Reactions    Clindamycin Rash     Lab Results   Component Value Date    WBC 8.85 05/03/2025    HGB 12.0 (L) 05/03/2025    HCT 37.3 (L) 05/03/2025    MCV 89 05/03/2025     05/03/2025       Chemistry        Component Value Date/Time     05/03/2025 1054     12/03/2024 0933    K 3.5 05/03/2025 1054    K 5 12/03/2024 0933     05/03/2025 1054     12/03/2024 0933    CO2 25 05/03/2025 1054    CO2 31 12/03/2024 0933    BUN 19 05/03/2025 1054    CREATININE 1.1 05/03/2025 1054    GLU 87 05/03/2025 1054        Component Value Date/Time    CALCIUM 8.8 05/03/2025 1054    CALCIUM 9.1 12/03/2024 0933    ALKPHOS 106 05/03/2025 1054    AST 19 05/03/2025 1054    ALT 17 05/03/2025 1054    BILITOT 0.3 05/03/2025 1054    ESTGFRAFRICA 73 12/03/2024 0933        Results for orders placed or performed during the hospital encounter of 05/03/25   EKG 12-lead    Collection Time: 05/03/25 10:50 AM   Result Value Ref Range    QRS Duration 76 ms    [facility] QTC Calculation 418 ms    Narrative    Test Reason : Z13.6,    Vent. Rate :  85 BPM     Atrial Rate :  85 BPM     P-R Int : 172 ms          QRS Dur :  76 ms      QT Int : 352 ms       P-R-T Axes :  71  39  53 degrees    QTcB Int : 418 ms    Normal sinus rhythm  Normal ECG  No previous ECGs available  Confirmed by Tatianna Glover ([id number]) on 5/3/2025 3:24:27 PM    Referred By: AAAREFERRAL SELF           Confirmed By: Tatianna Glover     Pre-op Assessment    I have reviewed the Patient Summary  Reports.     I have reviewed the Nursing Notes. I have reviewed the NPO Status.   I have reviewed the Medications.     Review of Systems  Anesthesia Hx:  No problems with previous Anesthesia                Hematology/Oncology:  Hematology Normal   Oncology Normal                                   EENT/Dental:  EENT/Dental Normal           Cardiovascular:  Cardiovascular Normal                                              Pulmonary:  Pulmonary Normal                       Renal/:  Renal/ Normal                 Hepatic/GI:  Hepatic/GI Normal                    Musculoskeletal:  Musculoskeletal Normal                Neurological:  Neurology Normal                                      Endocrine:   Hypothyroidism       Hypothyroidism          Dermatological:  Skin Normal    Psych:  Psychiatric History                Physical Exam  General: Well nourished, Cooperative and Alert    Airway:  Mouth Opening: Normal  TM Distance: Normal  Tongue: Normal  Neck ROM: Normal ROM    Dental:  Intact    Chest/Lungs:  Clear to auscultation    Heart:  Rate: Normal  Rhythm: Regular Rhythm  Sounds: Normal      Anesthesia Plan  Type of Anesthesia, risks & benefits discussed:    Anesthesia Type: Gen ETT, Gen Supraglottic Airway  Intra-op Monitoring Plan: Standard ASA Monitors  Post Op Pain Control Plan: multimodal analgesia  Induction:  IV  Airway Plan: Direct  Informed Consent: Informed consent signed with the Patient and all parties understand the risks and agree with anesthesia plan.  All questions answered. Patient consented to blood products? Yes  ASA Score: 2  Day of Surgery Review of History & Physical: H&P Update referred to the surgeon/provider.I have interviewed and examined the patient. I have reviewed the patient's H&P dated: There are no significant changes. H&P completed by Anesthesiologist.    Ready For Surgery From Anesthesia Perspective.     .             [1]  No current facility-administered medications on file prior  to encounter.     Current Outpatient Medications on File Prior to Encounter   Medication Sig Dispense Refill    dextroamphetamine-amphetamine (ADDERALL) 20 mg tablet TK 1 T PO TID      diazePAM (VALIUM) 10 MG Tab Take 5-10 mg by mouth nightly as needed.      doxycycline (VIBRAMYCIN) 100 MG Cap Take 1 capsule (100 mg total) by mouth 2 (two) times daily. for 10 days 20 capsule 0    lithium (LITHOTAB) 300 mg tablet Take 600 mg by mouth 2 (two) times daily.      oxyCODONE-acetaminophen (PERCOCET) 5-325 mg per tablet Take 1 tablet by mouth every 6 (six) hours as needed for Pain. 12 tablet 0    sulfamethoxazole-trimethoprim 800-160mg (BACTRIM DS) 800-160 mg Tab Take 1 tablet by mouth 2 (two) times daily. for 7 days 14 tablet 0    tamsulosin (FLOMAX) 0.4 mg Cap

## 2025-05-04 LAB
ABSOLUTE EOSINOPHIL (OHS): 0.19 K/UL
ABSOLUTE MONOCYTE (OHS): 0.65 K/UL (ref 0.3–1)
ABSOLUTE NEUTROPHIL COUNT (OHS): 3.92 K/UL (ref 1.8–7.7)
ALBUMIN SERPL BCP-MCNC: 2.6 G/DL (ref 3.5–5.2)
ALP SERPL-CCNC: 81 UNIT/L (ref 40–150)
ALT SERPL W/O P-5'-P-CCNC: 16 UNIT/L (ref 10–44)
ANION GAP (OHS): 6 MMOL/L (ref 8–16)
AST SERPL-CCNC: 12 UNIT/L (ref 11–45)
BASOPHILS # BLD AUTO: 0.06 K/UL
BASOPHILS NFR BLD AUTO: 1 %
BILIRUB SERPL-MCNC: 0.3 MG/DL (ref 0.1–1)
BUN SERPL-MCNC: 17 MG/DL (ref 8–23)
CALCIUM SERPL-MCNC: 8 MG/DL (ref 8.7–10.5)
CHLORIDE SERPL-SCNC: 110 MMOL/L (ref 95–110)
CO2 SERPL-SCNC: 23 MMOL/L (ref 23–29)
CREAT SERPL-MCNC: 0.9 MG/DL (ref 0.5–1.4)
ERYTHROCYTE [DISTWIDTH] IN BLOOD BY AUTOMATED COUNT: 13.5 % (ref 11.5–14.5)
GFR SERPLBLD CREATININE-BSD FMLA CKD-EPI: >60 ML/MIN/1.73/M2
GLUCOSE SERPL-MCNC: 97 MG/DL (ref 70–110)
GRAM STN SPEC: NORMAL
HCT VFR BLD AUTO: 32.4 % (ref 40–54)
HGB BLD-MCNC: 10.5 GM/DL (ref 14–18)
IMM GRANULOCYTES # BLD AUTO: 0.02 K/UL (ref 0–0.04)
IMM GRANULOCYTES NFR BLD AUTO: 0.3 % (ref 0–0.5)
LYMPHOCYTES # BLD AUTO: 1.12 K/UL (ref 1–4.8)
MAGNESIUM SERPL-MCNC: 2 MG/DL (ref 1.6–2.6)
MCH RBC QN AUTO: 29.1 PG (ref 27–31)
MCHC RBC AUTO-ENTMCNC: 32.4 G/DL (ref 32–36)
MCV RBC AUTO: 90 FL (ref 82–98)
NUCLEATED RBC (/100WBC) (OHS): 0 /100 WBC
PHOSPHATE SERPL-MCNC: 3.1 MG/DL (ref 2.7–4.5)
PLATELET # BLD AUTO: 248 K/UL (ref 150–450)
PMV BLD AUTO: 8.6 FL (ref 9.2–12.9)
POTASSIUM SERPL-SCNC: 4.2 MMOL/L (ref 3.5–5.1)
PROT SERPL-MCNC: 5.1 GM/DL (ref 6–8.4)
RBC # BLD AUTO: 3.61 M/UL (ref 4.6–6.2)
RELATIVE EOSINOPHIL (OHS): 3.2 %
RELATIVE LYMPHOCYTE (OHS): 18.8 % (ref 18–48)
RELATIVE MONOCYTE (OHS): 10.9 % (ref 4–15)
RELATIVE NEUTROPHIL (OHS): 65.8 % (ref 38–73)
SODIUM SERPL-SCNC: 139 MMOL/L (ref 136–145)
VANCOMYCIN SERPL-MCNC: 7.9 UG/ML (ref ?–80)
WBC # BLD AUTO: 5.96 K/UL (ref 3.9–12.7)

## 2025-05-04 PROCEDURE — 80053 COMPREHEN METABOLIC PANEL: CPT | Performed by: ORTHOPAEDIC SURGERY

## 2025-05-04 PROCEDURE — 36415 COLL VENOUS BLD VENIPUNCTURE: CPT | Performed by: ORTHOPAEDIC SURGERY

## 2025-05-04 PROCEDURE — 80202 ASSAY OF VANCOMYCIN: CPT | Performed by: ORTHOPAEDIC SURGERY

## 2025-05-04 PROCEDURE — 63600175 PHARM REV CODE 636 W HCPCS: Performed by: STUDENT IN AN ORGANIZED HEALTH CARE EDUCATION/TRAINING PROGRAM

## 2025-05-04 PROCEDURE — 99024 POSTOP FOLLOW-UP VISIT: CPT | Mod: POP,,, | Performed by: PHYSICIAN ASSISTANT

## 2025-05-04 PROCEDURE — 85025 COMPLETE CBC W/AUTO DIFF WBC: CPT | Performed by: ORTHOPAEDIC SURGERY

## 2025-05-04 PROCEDURE — 25000003 PHARM REV CODE 250: Performed by: STUDENT IN AN ORGANIZED HEALTH CARE EDUCATION/TRAINING PROGRAM

## 2025-05-04 PROCEDURE — 84100 ASSAY OF PHOSPHORUS: CPT | Performed by: ORTHOPAEDIC SURGERY

## 2025-05-04 PROCEDURE — 97161 PT EVAL LOW COMPLEX 20 MIN: CPT

## 2025-05-04 PROCEDURE — 97530 THERAPEUTIC ACTIVITIES: CPT

## 2025-05-04 PROCEDURE — 21400001 HC TELEMETRY ROOM

## 2025-05-04 PROCEDURE — 97110 THERAPEUTIC EXERCISES: CPT

## 2025-05-04 PROCEDURE — 25000003 PHARM REV CODE 250: Performed by: ORTHOPAEDIC SURGERY

## 2025-05-04 PROCEDURE — 94761 N-INVAS EAR/PLS OXIMETRY MLT: CPT

## 2025-05-04 PROCEDURE — 63600175 PHARM REV CODE 636 W HCPCS: Performed by: ORTHOPAEDIC SURGERY

## 2025-05-04 PROCEDURE — 83735 ASSAY OF MAGNESIUM: CPT | Performed by: ORTHOPAEDIC SURGERY

## 2025-05-04 PROCEDURE — 97165 OT EVAL LOW COMPLEX 30 MIN: CPT

## 2025-05-04 RX ADMIN — CEFEPIME 1 G: 1 INJECTION, POWDER, FOR SOLUTION INTRAMUSCULAR; INTRAVENOUS at 06:05

## 2025-05-04 RX ADMIN — CEFEPIME 1 G: 1 INJECTION, POWDER, FOR SOLUTION INTRAMUSCULAR; INTRAVENOUS at 09:05

## 2025-05-04 RX ADMIN — CEFEPIME 1 G: 1 INJECTION, POWDER, FOR SOLUTION INTRAMUSCULAR; INTRAVENOUS at 04:05

## 2025-05-04 RX ADMIN — VANCOMYCIN HYDROCHLORIDE 1250 MG: 1.25 INJECTION, POWDER, LYOPHILIZED, FOR SOLUTION INTRAVENOUS at 05:05

## 2025-05-04 RX ADMIN — CHLORHEXIDINE GLUCONATE 0.12% ORAL RINSE 10 ML: 1.2 LIQUID ORAL at 08:05

## 2025-05-04 RX ADMIN — TAMSULOSIN HYDROCHLORIDE 0.4 MG: 0.4 CAPSULE ORAL at 08:05

## 2025-05-04 RX ADMIN — VANCOMYCIN HYDROCHLORIDE 1250 MG: 1.25 INJECTION, POWDER, LYOPHILIZED, FOR SOLUTION INTRAVENOUS at 07:05

## 2025-05-04 NOTE — PLAN OF CARE
OT EVAL COMPLETE.  NO SKILLED OT INTERVENTION NEEDS IDENTIFIED.  OT RECOMMENDS D/C HOME WITH NO THERAPY NOR EQUIP RECS.  PATIENT WAS ABLE TO DEMO SAFETY TO STEP IN/OUT OF TUB WITH MOD (I) WITH GRAB BAR AS HE WOULD AT HOME AND DEMO'ED (I) WITH TOILET T/F WITH NO HAND SUPPORT.

## 2025-05-04 NOTE — NURSING TRANSFER
Nursing Transfer Note      5/3/2025   7:23 PM    Nurse giving handoff:Kuldip    Nurse receiving handoff:Judi     Reason patient is being transferred: surgery care complete     Transfer To: 207    Transfer via stretcher    Transfer with monitor     Transported by kuldip     Transfer Vital Signs:  Blood Pressure:126/67  Heart Rate:75  O2:96  Temperature:98.0    Respirations:14    Telemetry: Box Number [id number]  Order for Tele Monitor? Yes    Additional Lines: none    Medicines sent: none    Any special needs or follow-up needed: none    Patient belongings transferred with patient: No    Chart send with patient: Yes    Notified: no family to notify    Patient reassessed at: 5/3/25 1919 (date, time)  1  Upon arrival to floor: cardiac monitor applied, patient oriented to room, call bell in reach, and bed in lowest position
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
## 2025-05-04 NOTE — PLAN OF CARE
[facility] - Telemetry (Hospital)  Initial Discharge Assessment       Primary Care Provider: Madeline, Primary Doctor    Admission Diagnosis: Screening for cardiovascular condition [Z13.6]  Left knee pain [M25.562]  Prepatellar bursitis of left knee [M70.42]  Cellulitis of left knee [L03.116]  Chest pain [R07.9]  Infection of patella [M86.9]    Admission Date: 5/3/2025  Expected Discharge Date: Per Attending     Transition of Care Barriers: None    Payor: MEDICARE / Plan: MEDICARE PART A & B / Product Type: Government /     Extended Emergency Contact Information  Primary Emergency Contact: John Ambriz  Mobile Phone: [phone]  Relation: Friend  Preferred language: English   needed? No    Discharge Plan A: Home       No Pharmacies Listed    Initial Assessment (most recent)       Adult Discharge Assessment - 05/04/25 0942          Discharge Assessment    Assessment Type Discharge Planning Assessment     Confirmed/corrected address, phone number and insurance Yes     Confirmed Demographics Correct on Facesheet     Source of Information patient     Communicated DAGOBERTO with patient/caregiver Date not available/Unable to determine     Reason For Admission prepatellar bursitis of left knee     People in Home alone     Do you expect to return to your current living situation? Yes     Do you have help at home or someone to help you manage your care at home? Yes     Who are your caregiver(s) and their phone number(s)? John oswald     Prior to hospitilization cognitive status: Alert/Oriented     Current cognitive status: Alert/Oriented     Walking or Climbing Stairs Difficulty no     Dressing/Bathing Difficulty no     Home Layout Able to live on 1st floor     Equipment Currently Used at Home none     Readmission within 30 days? No     Patient currently being followed by outpatient case management? No     Do you currently have service(s) that help you manage your care at home? No     Do you take prescription medications? Yes      Do you have prescription coverage? Yes     Coverage [place]     Do you have any problems affording any of your prescribed medications? No     Is the patient taking medications as prescribed? yes     Who is going to help you get home at discharge? friend, John     How do you get to doctors appointments? car, drives self     Are you on dialysis? No     Do you take coumadin? No     Discharge Plan A Home     DME Needed Upon Discharge  none     Discharge Plan discussed with: Patient     Transition of Care Barriers None                   Patient has no d/c needs at this time. Sw to follow up, as needed, for d/c planning purposes.

## 2025-05-04 NOTE — PT/OT/SLP EVAL
Occupational Therapy   Evaluation and Discharge Note    Name: Eloy Jean  MRN: [id number]  Admitting Diagnosis: Prepatellar bursitis of left knee  Recent Surgery: Procedure(s) (LRB):  IRRIGATION AND DEBRIDEMENT, LOWER EXTREMITY (Left)  ASPIRATION, ABSCESS (Left) 1 Day Post-Op    Recommendations:     Discharge Recommendations: No Therapy Indicated  Discharge Equipment Recommendations: none  Barriers to discharge:  None    Assessment:     Eloy Jean is a 65 y.o. male with a medical diagnosis of Prepatellar bursitis of left knee. At this time, patient is functioning at their prior level of function and does not require further acute OT services.     Plan:     During this hospitalization, patient does not require further acute OT services.  Please re-consult if situation changes.    Plan of Care Reviewed with: patient    Subjective     Chief Complaint: DIFFICULTY MOVING LLE WHEN HE FIRST GETS OOB.  Patient/Family Comments/goals: NONE STATED.    Occupational Profile:  Living Environment: PATIENT LIVES ALONE IN A 1 STORY HOUSE WITH NO STEPS.   Previous level of function: PATIENT STATED HE WAS (I) WITH ALL LEVELS OF SELF CARE AND WAS DRIVING. Equipment Used at home: grab bar, other (see comments) (TUB GRAB BAR)  Assistance upon Discharge: FRIENDS    Pain/Comfort:  Pain Rating 1: 4/10  Location - Side 1: Left  Location 1: knee    Patients cultural, spiritual, Muslim conflicts given the current situation:      Objective:     Communicated with: NURSE prior to session.  Patient found HOB elevated with   upon OT entry to room.    General Precautions: Standard, fall  Orthopedic Precautions: N/A  Braces: N/A  Respiratory Status: Room air     Occupational Performance:    Bed Mobility:    (I) ALL LEVELS.    Functional Mobility/Transfers:  Patient completed Sit <> Stand Transfer with independence  with  no assistive device   Patient completed Toilet Transfer Stand Pivot technique with modified independence with  no  AD  Patient completed Tub Transfer Step Transfer technique with modified independence with grab bars  Functional Mobility: MOD (I) > (I) WITH NO AD/DME EXCEPT FOR TUB T/F WITH GRAB BAR.    Activities of Daily Living:  (I) ALL LEVELS.    Cognitive/Visual Perceptual:  PATIENT WITH DX OF BIPOLAR BUT NO COGNITIVE DEFICITS NOTED.  PATIENT WEARS CORRECTIVE LENSES.    Physical Exam:  Balance:    -       NO LOB WITH TOILET T/F NOR TO STEP IN/OUT TUB WITH GRAB BAR AS HAND SUPPORT  Upper Extremity Range of Motion:     -       Right Upper Extremity: WFL  -       Left Upper Extremity: WFL    AMPAC 6 Click ADL:  AMPA Total Score: 24    Treatment & Education:  OT EVAL PERFORMED.  PATIENT WAS EDUCATED RE: PURPOSE OF OT WITH FOCUS ON SAFETY AND (I) WITH SELF CARE. PATIENT PARTICIPATED IN [facility] MOBILITY AND SELF CARE TASKS WITH NO DEFICITS NOTED.    Patient left HOB elevated with all lines intact and call button in reach    GOALS:   Multidisciplinary Problems       Occupational Therapy Goals          Problem: Occupational Therapy    Goal Priority Disciplines Outcome Interventions   Occupational Therapy Goal     OT, PT/OT     Description: OT EVAL COMPLETE.  NO SKILLED OT INTERVENTION NEEDS IDENTIFIED.  OT RECOMMENDS D/C HOME WITH NO THERAPY NOR EQUIP RECS.  PATIENT WAS ABLE TO DEMO SAFETY TO STEP IN/OUT OF TUB WITH MOD (I) WITH GRAB BAR AS HE WOULD AT HOME AND DEMO'ED (I) WITH TOILET T/F WITH NO HAND SUPPORT                       DME Justifications:  No DME recommended requiring DME justifications    History:     Past Medical History:   Diagnosis Date    Cancer     bladder cancer         Past Surgical History:   Procedure Laterality Date    BACK SURGERY      CYSTOSCOPY      Yearly to monitor bladder cancer    FRACTURE SURGERY      previous hip fx       Time Tracking:     OT Date of Treatment: 05/04/25  OT Start Time: 1230  OT Stop Time: 1250  OT Total Time (min): 20 min    Billable Minutes:Evaluation 7  Therapeutic Activity  13    5/4/2025

## 2025-05-04 NOTE — PT/OT/SLP EVAL
Physical Therapy Evaluation    Patient Name:  Eloy Jean   MRN:  [id number]    Recommendations:     Discharge Recommendations: Low Intensity Therapy   Discharge Equipment Recommendations: none   Barriers to discharge: None    Assessment:     Eloy Jean is a 65 y.o. male admitted with a medical diagnosis of Prepatellar bursitis of left knee.  He presents with the following impairments/functional limitations: impaired functional mobility, weakness, decreased ROM .Will benefit from acute care PT during stay and OPT after DC.    Rehab Prognosis: Good; patient would benefit from acute skilled PT services to address these deficits and reach maximum level of function.    Recent Surgery: Procedure(s) (LRB):  IRRIGATION AND DEBRIDEMENT, LOWER EXTREMITY (Left)  ASPIRATION, ABSCESS (Left) 1 Day Post-Op    Plan:     During this hospitalization, patient to be seen 3 x/week to address the identified rehab impairments via gait training, therapeutic activities, therapeutic exercises, neuromuscular re-education and progress toward the following goals:    Plan of Care Expires:  05/04/25    Subjective     Chief Complaint: knee stiffness  Patient/Family Comments/goals: return to normal  Pain/Comfort:  Pain Rating 1: 4/10  Location - Side 1: Right  Location 1: knee    Patients cultural, spiritual, Holiness conflicts given the current situation:      Living Environment:  Pt lives alone. Retired. Very active and rides motorcycle. IND with all mobility  Prior to admission, patients level of function was IND.  Equipment used at home: none.  DME owned (not currently used): none.  Upon discharge, patient will have assistance from friends.    Objective:     Communicated with [facility]  prior to session.  Patient found supine with    upon PT entry to room.    General Precautions: Standard,    Orthopedic Precautions:    Braces:    Respiratory Status: Room air    Exams:  RLE ROM: Deficits: 0-75 deg  RLE Strength: Deficits: quad 3+, HS 2/5  LLE  ROM: WFL  LLE Strength: WFL    Functional Mobility:  Bed Mobility:     Supine to Sit: independence  Transfers:     Bed to Chair: independence with  no AD  using  Stand Pivot  Gait: 250 ft IND      AM-PAC 6 CLICK MOBILITY  Total Score:23       Treatment & Education:  Following eval began post op knee exercises- ankle pumps, quad sets, SLR and heel slides 2x 10.    Patient left HOB elevated with all lines intact and call button in reach.    GOALS:   Multidisciplinary Problems       Physical Therapy Goals          Problem: Physical Therapy    Goal Priority Disciplines Outcome Interventions   Physical Therapy Goal     PT, PT/OT     Description: Following goals will be met in 14 days  1. Improve knee ROM to WFL  2. Improve SL balance to 10  3 IND with stairs                       DME Justifications:  No DME recommended requiring DME justifications    History:     Past Medical History:   Diagnosis Date    Cancer     bladder cancer       Past Surgical History:   Procedure Laterality Date    BACK SURGERY      CYSTOSCOPY      Yearly to monitor bladder cancer    FRACTURE SURGERY      previous hip fx       Time Tracking:     PT Received On: 05/04/25  PT Start Time: 1310     PT Stop Time: 1335  PT Total Time (min): 25 min     Billable Minutes: Evaluation 15 and Therapeutic Exercise 10      05/04/2025

## 2025-05-04 NOTE — PLAN OF CARE
A206/A206 HUMADavid Jean is a 65 y.o.male admitted on 5/3/2025 for Prepatellar bursitis of left knee   Code Status: Full Code MRN: [id number]   Review of patient's allergies indicates:   Allergen Reactions    Clindamycin Rash     Past Medical History:   Diagnosis Date    Cancer     bladder cancer      PRN meds    acetaminophen, 650 mg, Q4H PRN  aluminum-magnesium hydroxide-simethicone, 30 mL, QID PRN  dextrose 50%, 12.5 g, PRN  dextrose 50%, 12.5 g, PRN  dextrose 50%, 25 g, PRN  diazePAM, 5 mg, BID PRN  diphenhydrAMINE, 25 mg, Q6H PRN  glucagon (human recombinant), 1 mg, PRN  glucose, 16 g, PRN  glucose, 24 g, PRN  HYDROmorphone, 0.2 mg, Q5 Min PRN  naloxone, 0.02 mg, PRN  ondansetron, 4 mg, Q8H PRN  ondansetron, 4 mg, Daily PRN  ondansetron, 4 mg, Daily PRN  oxyCODONE-acetaminophen, 1 tablet, Q4H PRN  prochlorperazine, 5 mg, Q6H PRN  sodium chloride 0.9%, 10 mL, Q12H PRN  sodium chloride 0.9%, 10 mL, PRN  vancomycin - pharmacy to dose, , pharmacy to manage frequency      Chart check completed. Will continue plan of care.      Orientation: oriented x 4  Mobile Coma Scale Score: 15     Lead Monitored: Lead II Rhythm: normal sinus rhythm    Cardiac/Telemetry Box Number: [id number]  VTE Core Measure: (SCDs) Sequential compression device initiated/maintained Last Bowel Movement: 05/01/25  Diet Adult Regular     Karan Score: 20  Fall Risk Score: 13  Accucheck []   Freq?      Lines/Drains/Airways       Peripheral Intravenous Line  Duration                  Peripheral IV - Single Lumen 05/03/25 1051 20 G Left Antecubital <1 day

## 2025-05-04 NOTE — PLAN OF CARE
A206/A206 HUMADavid Jean is a 65 y.o.male admitted on 5/3/2025 for Prepatellar bursitis of left knee   Code Status: Full Code MRN: [id number]   Review of patient's allergies indicates:   Allergen Reactions    Clindamycin Rash     Past Medical History:   Diagnosis Date    Cancer     bladder cancer      PRN meds    acetaminophen, 650 mg, Q4H PRN  aluminum-magnesium hydroxide-simethicone, 30 mL, QID PRN  dextrose 50%, 12.5 g, PRN  dextrose 50%, 12.5 g, PRN  dextrose 50%, 25 g, PRN  diazePAM, 5 mg, BID PRN  diphenhydrAMINE, 25 mg, Q6H PRN  glucagon (human recombinant), 1 mg, PRN  glucose, 16 g, PRN  glucose, 24 g, PRN  HYDROmorphone, 0.2 mg, Q5 Min PRN  naloxone, 0.02 mg, PRN  ondansetron, 4 mg, Q8H PRN  ondansetron, 4 mg, Daily PRN  ondansetron, 4 mg, Daily PRN  oxyCODONE-acetaminophen, 1 tablet, Q4H PRN  prochlorperazine, 5 mg, Q6H PRN  sodium chloride 0.9%, 10 mL, Q12H PRN  sodium chloride 0.9%, 10 mL, PRN  vancomycin - pharmacy to dose, , pharmacy to manage frequency      Chart check completed. Will continue plan of care.     Post op day one from I&D   Continue ABX , pharmacy to dose vanc   ID to make recs        Orientation: oriented x 4  Valeria Coma Scale Score: 15     Lead Monitored: Lead II Rhythm: normal sinus rhythm    Cardiac/Telemetry Box Number: [id number]  VTE Core Measure: Patient refused interventions (refused SCD per cellulitis of Left knee and leg) Last Bowel Movement: 05/01/25  Diet Adult Regular     Karan Score: 20  Fall Risk Score: 13  Accucheck []   Freq?      Lines/Drains/Airways       Peripheral Intravenous Line  Duration                  Peripheral IV - Single Lumen 05/03/25 1051 20 G Left Antecubital 1 day
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
## 2025-05-04 NOTE — PROGRESS NOTES
[facility])  Orthopedics  Progress Note    Patient Name: Eloy Jean  MRN: [id number]  Admission Date: 5/3/2025  Hospital Length of Stay: 1 days  Attending Provider: Faith Lomas,*  Primary Care Provider: Madeline, Primary Doctor  Follow-up For: Procedure(s) (LRB):  IRRIGATION AND DEBRIDEMENT, LOWER EXTREMITY (Left)  ASPIRATION, ABSCESS (Left)    Post-Operative Day: 1 Day Post-Op  Subjective:     Principal Problem:Prepatellar bursitis of left knee    Principal Orthopedic Problem:  Left knee septic prepatellar bursitis    Interval History: Eloy Jean is a 65 y.o. male postop day 1 status post irrigation and debridement with excisional debridement of left prepatellar abscess and aspiration of left knee joint.  Patient is resting comfortably in bed.  He states that his pain is not significantly changed when compared to preop.  Denies numbness or tingling in the extremity.    Review of patient's allergies indicates:   Allergen Reactions    Clindamycin Rash       Current Facility-Administered Medications   Medication    acetaminophen tablet 650 mg    aluminum-magnesium hydroxide-simethicone 200-200-20 mg/5 mL suspension 30 mL    ceFEPIme injection 1 g    chlorhexidine 0.12 % solution 10 mL    dextrose 50% injection 12.5 g    dextrose 50% injection 12.5 g    dextrose 50% injection 25 g    diazePAM tablet 5 mg    diphenhydrAMINE injection 25 mg    glucagon (human recombinant) injection 1 mg    glucose chewable tablet 16 g    glucose chewable tablet 24 g    hydrALAZINE injection 10 mg    HYDROmorphone (PF) injection 0.2 mg    naloxone 0.4 mg/mL injection 0.02 mg    ondansetron injection 4 mg    ondansetron injection 4 mg    ondansetron injection 4 mg    oxyCODONE-acetaminophen 5-325 mg per tablet 1 tablet    polyethylene glycol packet 17 g    prochlorperazine injection Soln 5 mg    sodium chloride 0.9% flush 10 mL    sodium chloride 0.9% flush 10 mL    tamsulosin 24 hr capsule 0.4 mg    vancomycin -  "pharmacy to dose    vancomycin 1,250 mg in 0.9% NaCl 250 mL IVPB (admixture device)     Objective:     Vital Signs (Most Recent):  Temp: 98.2 °F (36.8 °C) (05/04/25 1151)  Pulse: 78 (05/04/25 1151)  Resp: 18 (05/04/25 1151)  BP: 132/65 (05/04/25 1151)  SpO2: 96 % (05/04/25 1151) Vital Signs (24h Range):  Temp:  [97.8 °F (36.6 °C)-98.6 °F (37 °C)] 98.2 °F (36.8 °C)  Pulse:  [63-93] 78  Resp:  [11-20] 18  SpO2:  [94 %-100 %] 96 %  BP: (123-145)/(58-82) 132/65     Weight: 80 kg (176 lb 5.9 oz)  Height: 6' 2" (188 cm)  Body mass index is 22.64 kg/m².      Intake/Output Summary (Last 24 hours) at 5/4/2025 1205  Last data filed at 5/4/2025 0909  Gross per 24 hour   Intake 2283.25 ml   Output 1100 ml   Net 1183.25 ml       Ortho/SPM Exam  Left lower extremity:   Dressings are clean, dry, and intact   Moderate edema   Moderate TTP  ROM decreased secondary to pain and swelling   Calf and compartments are soft and compressible   Motor exam normal   Sensation and pulses intact   Cap refill brisk    GEN: Well developed, well nourished male. AAOX3. No acute distress.   Head: Normocephalic, atraumatic.   Eyes: THOMAS  Neck: Trachea is midline, no adenopathy  Resp: Breathing unlabored.  Neuro: Motor function normal, Cranial nerves intact  Psych: Mood and affect appropriate.      Significant Labs:   Recent Lab Results  (Last 5 results in the past 24 hours)        05/04/25  0451   05/03/25  1827   05/03/25  1825   05/03/25  1412   05/03/25  1349        Albumin 2.6               ALP 81               ALT 16               Anion Gap 6               Appearance, UA         Hazy       AST 12               Baso # 0.06               Basophil % 1.0               Bilirubin (UA)         Negative       BILIRUBIN TOTAL 0.3  Comment: For infants and newborns, interpretation of results should be based   on gestational age, weight and in agreement with clinical   observations.    Premature Infant recommended reference ranges:   0-24 hours:  <8.0 " mg/dL   24-48 hours: <12.0 mg/dL   3-5 days:    <15.0 mg/dL   6-29 days:   <15.0 mg/dL               BUN 17               Calcium 8.0               Chloride 110               CO2 23               Color, UA         Yellow       Creatinine 0.9               eGFR >60  Comment: Estimated GFR calculated using the CKD-EPI creatinine (2021) equation.               Eos # 0.19               Eos % 3.2               Glucose 97               Glucose, UA         Negative       GRAM STAIN   Few WBC seen   Moderate WBC seen  Comment: Corrected result: Previously reported as Rare WBC seen on 5/4/2025 at 1008 CDT.  [C]              Many Gram positive cocci   Many Gram positive cocci  Comment: Corrected result: Previously reported as No organisms seen on 5/4/2025 at 1008 CDT.  [C]           Gran # (ANC) 3.92               Hematocrit 32.4               Hemoglobin 10.5               Extra Tube         Hold for add-ons.  Comment: Auto resulted.          Immature Grans (Abs) 0.02  Comment: Mild elevation in immature granulocytes is non specific and can be seen in a variety of conditions including stress response, acute inflammation, trauma and pregnancy. Correlation with other laboratory and clinical findings is essential.               Immature Granulocytes 0.3               Ketones, UA         Negative       Lactic Acid Level       0.8         Leukocyte Esterase, UA         Negative       Lymph # 1.12               Lymph % 18.8               Magnesium  2.0               MCH 29.1               MCHC 32.4               MCV 90               Mono # 0.65               Mono % 10.9               MPV 8.6               Neut % 65.8               NITRITE UA         Negative       nRBC 0               Blood, UA         Negative       pH, UA         7.0       Phosphorus Level 3.1               Platelet Count 248               Potassium 4.2               PROTEIN TOTAL 5.1               Protein, UA         Negative  Comment: Recommend a 24 hour urine  protein or a urine protein/creatinine ratio if globulin induced proteinuria is clinically suspected.       RBC 3.61               RDW 13.5               Sodium 139               Spec Grav UA         1.020       Urobilinogen, UA         Negative       Vancomycin, Random 7.9               WBC 5.96                                       [C] - Corrected Result             All pertinent labs within the past 24 hours have been reviewed.    Significant Imaging: I have reviewed and interpreted all pertinent imaging results/findings.    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 male postop day 1 status post irrigation and debridement with excisional debridement of left prepatellar abscess and aspiration of left knee joint    Plan:  Weightbearing as tolerated to left lower extremity   PT/OT for gait training and ADLs   ROM as tolerated to the left knee   DVT prophylaxis per primary team   IV antibiotics per primary team, intraoperative cultures are pending, appreciate Infectious Disease input   Patient is ready for discharge from orthopedic standpoint once discharge antibiotics have been arranged   We will see the patient back in Ortho Trauma Clinic at 2 weeks postop    Balaji Murillo PA-C  Orthopedics  [facility] - Telemetry ([facility])

## 2025-05-04 NOTE — SUBJECTIVE & OBJECTIVE
Interval History:     No acute events overnight   Stated slight improvement in pain, discomfort over left knee   Remained afebrile, no leukocytosis   Tolerating diet without issues   Pending cultures   Follow up with ortho, ID   PT OT    Review of Systems      Constitutional:  Positive for fatigue.   Musculoskeletal:  Positive for gait problem and joint swelling.   Skin:  Positive for wound (left knee).       Objective:     Vital Signs (Most Recent):  Temp: 98.4 °F (36.9 °C) (05/04/25 0747)  Pulse: 93 (05/04/25 1131)  Resp: 18 (05/04/25 0747)  BP: 128/62 (05/04/25 0747)  SpO2: 97 % (05/04/25 0758) Vital Signs (24h Range):  Temp:  [97.8 °F (36.6 °C)-98.6 °F (37 °C)] 98.4 °F (36.9 °C)  Pulse:  [63-93] 93  Resp:  [11-20] 18  SpO2:  [94 %-100 %] 97 %  BP: (123-153)/(58-82) 128/62     Weight: 80 kg (176 lb 5.9 oz)  Body mass index is 22.64 kg/m².    Intake/Output Summary (Last 24 hours) at 5/4/2025 1149  Last data filed at 5/4/2025 0909  Gross per 24 hour   Intake 2283.25 ml   Output 1100 ml   Net 1183.25 ml         Physical Exam      Constitutional:       General: He is not in acute distress.     Appearance: He is normal weight. He is ill-appearing. He is not diaphoretic.   HENT:      Head: Normocephalic and atraumatic.      Right Ear: Hearing and external ear normal.      Left Ear: Hearing and external ear normal.      Nose: Nose normal. No mucosal edema or rhinorrhea.      Mouth/Throat:      Pharynx: Uvula midline.   Eyes:      General:         Right eye: No discharge.         Left eye: No discharge.      Conjunctiva/sclera: Conjunctivae normal.      Right eye: No chemosis.     Left eye: No chemosis.     Pupils: Pupils are equal, round, and reactive to light.   Neck:      Thyroid: No thyroid mass or thyromegaly.      Trachea: Trachea normal.   Cardiovascular:      Rate and Rhythm: Normal rate and regular rhythm.      Pulses:           Dorsalis pedis pulses are 2+ on the right side and 2+ on the left side.      Heart  sounds: Normal heart sounds. No murmur heard.  Pulmonary:      Effort: Pulmonary effort is normal. No respiratory distress.      Breath sounds: Normal breath sounds. No decreased breath sounds or wheezing.   Abdominal:      General: Bowel sounds are normal. There is no distension.      Palpations: Abdomen is soft.      Tenderness: There is no abdominal tenderness.   Musculoskeletal:      Cervical back: Normal range of motion and neck supple.      Left knee: dressing intact; C/D/L; no erythema, drainage noted.    Lymphadenopathy:      Cervical: No cervical adenopathy.      Upper Body:      Right upper body: No supraclavicular adenopathy.      Left upper body: No supraclavicular adenopathy.   Skin:     General: Skin is warm and dry.      Capillary Refill: Capillary refill takes less than 2 seconds.      Findings: No rash.           Neurological:      Mental Status: He is alert and oriented to person, place, and time.   Psychiatric:         Mood and Affect: Mood is not anxious.         Speech: Speech normal.         Behavior: Behavior normal.         Thought Content: Thought content normal.         Judgment: Judgment normal.              CRANIAL NERVES      CN III, IV, VI   Pupils are equal, round, and reactive to light.       Significant Labs: All pertinent labs within the past 24 hours have been reviewed.  CBC:   Recent Labs   Lab 05/03/25  1054 05/04/25  0451   WBC 8.85 5.96   HGB 12.0* 10.5*   HCT 37.3* 32.4*    248     CMP:   Recent Labs   Lab 05/03/25  1054 05/04/25  0451    139   K 3.5 4.2    110   CO2 25 23   GLU 87 97   BUN 19 17   CREATININE 1.1 0.9   CALCIUM 8.8 8.0*   PROT 6.1 5.1*   ALBUMIN 3.1* 2.6*   BILITOT 0.3 0.3   ALKPHOS 106 81   AST 19 12   ALT 17 16   ANIONGAP 9 6*       Significant Imaging:   Imaging Results              X-Ray Knee Complete 4 or More Views Left (Final result)  Result time 05/03/25 12:14:50      Final result by Ed Barrera MD (05/03/25 12:14:50)                    Impression:     See above.    Finalized on: 5/3/2025 12:14 PM By:  Ed Barrera MD  [facility]# [id number]      2025-05-03 12:16:57.324     [facility]               Narrative:    EXAM:  XR KNEE COMP 4 OR MORE VIEWS LEFT    CLINICAL HISTORY:    Left knee joint pain    TECHNIQUE: 4 views of the left knee.    COMPARISON: None.    FINDINGS:    Bone density and architecture are normal. No acute bony abnormality.  Mild prepatellar soft tissue thickening/edema suspected.  Please correlate with clinical exam.                                         X-Ray Chest AP Portable (Final result)  Result time 05/03/25 10:31:39      Final result by Ed Barrera MD (05/03/25 10:31:39)                   Impression:     No acute findings.    Finalized on: 5/3/2025 10:31 AM By:  Ed Barrera MD  [facility]# [id number]      2025-05-03 10:33:41.887     [facility]               Narrative:    EXAM:    XR CHEST AP PORTABLE    CLINICAL HISTORY:    Respiratory distress    TECHNIQUE: Single view of the chest.    COMPARISON: None    FINDINGS:  The heart size is normal.  Thoracic aortic atherosclerosis is present.  The lung fields are clear at this time.

## 2025-05-04 NOTE — ANESTHESIA POSTPROCEDURE EVALUATION
Anesthesia Post Evaluation    Patient: Eloy Jean    Procedure(s) Performed: Procedure(s) (LRB):  IRRIGATION AND DEBRIDEMENT, LOWER EXTREMITY (Left)  ASPIRATION, ABSCESS (Left)    Final Anesthesia Type: general      Patient location during evaluation: PACU  Patient participation: Yes- Able to Participate  Level of consciousness: awake and alert  Post-procedure vital signs: reviewed and stable  Pain management: adequate  Airway patency: patent  MARY CARMEN mitigation strategies: Extubation while patient is awake  PONV status at discharge: No PONV  Anesthetic complications: no      Cardiovascular status: hemodynamically stable  Respiratory status: spontaneous ventilation  Hydration status: euvolemic  Follow-up not needed.              Vitals Value Taken Time   /67 05/03/25 19:06   Temp 36.8 °C (98.3 °F) 05/03/25 19:06   Pulse 78 05/03/25 19:09   Resp 51 05/03/25 19:09   SpO2 95 % 05/03/25 19:09   Vitals shown include unfiled device data.      Event Time   Out of Recovery 19:07:00         Pain/Eliecer Score: Pain Rating Prior to Med Admin: 4 (5/3/2025  7:06 PM)  Eliecer Score: 10 (5/3/2025  7:00 PM)

## 2025-05-04 NOTE — ASSESSMENT & PLAN NOTE
S/p IRRIGATION AND DEBRIDEMENT, LOWER EXTREMITY (Left), Excisional debridement left pre patella abscess, Aspiration of left knee per ortho on 5/4/25; pending cultures, ID recommendations, PT OT evaluation
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
## 2025-05-04 NOTE — PROGRESS NOTES
Pharmacokinetic Assessment Follow Up: IV Vancomycin    Vancomycin serum concentration assessment(s):    The random level was drawn correctly and can be used to guide therapy at this time. The measurement is below the desired definitive target range of 10 to 20 mcg/mL.    Vancomycin Regimen Plan:    Change regimen to Vancomycin 1250 mg IV every 12 hours with next serum trough concentration measured at 1730 prior to fourth dose on 5/5/25.    Drug levels (last 3 results):  Recent Labs   Lab Result Units 05/04/25  0451   Vancomycin Random ug/ml 7.9       Pharmacy will continue to follow and monitor vancomycin.    Please contact pharmacy at extension [phone] for questions regarding this assessment.    Thank you for the consult,   Carlos Banegas       Patient brief summary:  Eloy Jean is a 65 y.o. male initiated on antimicrobial therapy with IV Vancomycin for treatment of bone/joint infection.    Drug Allergies:   Review of patient's allergies indicates:   Allergen Reactions    Clindamycin Rash       Actual Body Weight:   80 kg    Renal Function:   Estimated Creatinine Clearance: 92.6 mL/min (based on SCr of 0.9 mg/dL).,     Dialysis Method (if applicable):  N/A    CBC (last 72 hours):  Recent Labs   Lab Result Units 05/03/25  1054 05/04/25  0451   WBC K/uL 8.85 5.96   HGB gm/dL 12.0* 10.5*   HCT % 37.3* 32.4*   Platelet Count K/uL 289 248   Lymph % % 8.8* 18.8   Mono % % 9.7 10.9   Eos % % 0.5 3.2   Basophil % % 0.5 1.0       Metabolic Panel (last 72 hours):  Recent Labs   Lab Result Units 05/03/25  1054 05/03/25  1349 05/04/25  0451   Sodium mmol/L 142  --  139   Potassium mmol/L 3.5  --  4.2   Chloride mmol/L 108  --  110   CO2 mmol/L 25  --  23   Glucose mg/dL 87  --  97   Glucose, UA   --  Negative  --    BUN mg/dL 19  --  17   Creatinine mg/dL 1.1  --  0.9   Albumin g/dL 3.1*  --  2.6*   Bilirubin Total mg/dL 0.3  --  0.3   ALP unit/L 106  --  81   AST unit/L 19  --  12   ALT unit/L 17  --  16   Magnesium  mg/dL   --   --  2.0   Phosphorus Level mg/dL  --   --  3.1       Vancomycin Administrations:  vancomycin given in the last 96 hours                     vancomycin 1,500 mg in 0.9% NaCl 250 mL IVPB (admixture device) ()  Restarted 05/03/25 1628      Restarted  1557     1,500 mg New Bag  1513                    Microbiologic Results:  Microbiology Results (last 7 days)       Procedure Component Value Units Date/Time    Afb Culture Stain [[id number]] Collected: 05/03/25 1825    Order Status: Sent Specimen: Abscess from Knee, Left Updated: 05/04/25 0122    Afb Culture Stain [[id number]] Collected: 05/03/25 1827    Order Status: Sent Specimen: Abscess from Knee, Left Updated: 05/04/25 0120    Fungus culture [[id number]] Collected: 05/03/25 1825    Order Status: Sent Specimen: Abscess from Knee, Left Updated: 05/03/25 1900    Gram stain [[id number]] Collected: 05/03/25 1825    Order Status: Sent Specimen: Abscess from Knee, Left Updated: 05/03/25 1900    AFB Culture & Smear [[id number]] Collected: 05/03/25 1825    Order Status: Sent Specimen: Abscess from Knee, Left Updated: 05/03/25 1900    Aerobic culture [[id number]] Collected: 05/03/25 1825    Order Status: Sent Specimen: Abscess from Knee, Left Updated: 05/03/25 1855    Aerobic culture [[id number]] Collected: 05/03/25 1827    Order Status: Sent Specimen: Abscess from Knee, Left Updated: 05/03/25 1854    AFB Culture & Smear [[id number]] Collected: 05/03/25 1827    Order Status: Sent Specimen: Abscess from Knee, Left Updated: 05/03/25 1853    Gram stain [[id number]] Collected: 05/03/25 1827    Order Status: Sent Specimen: Abscess from Knee, Left Updated: 05/03/25 1853    Fungus culture [[id number]] Collected: 05/03/25 1827    Order Status: Sent Specimen: Abscess from Knee, Left Updated: 05/03/25 1853    Culture, Anaerobic [[id number]] Collected: 05/03/25 1827    Order Status: Sent Specimen: Abscess from Knee, Left Updated: 05/03/25 6989    Culture, Anaerobic [[id number]]  Collected: 05/03/25 1825    Order Status: Sent Specimen: Abscess from Knee, Left     Blood culture x two cultures. Draw prior to antibiotics. [[id number]] Collected: 05/03/25 1054    Order Status: Sent Specimen: Blood from Peripheral, Antecubital, Left Updated: 05/03/25 1643    Blood culture x two cultures. Draw prior to antibiotics. [[id number]] Collected: 05/03/25 1057    Order Status: Sent Specimen: Blood from Peripheral, Hand, Right Updated: 05/03/25 1112

## 2025-05-05 LAB
ABSOLUTE EOSINOPHIL (OHS): 0.21 K/UL
ABSOLUTE MONOCYTE (OHS): 0.53 K/UL (ref 0.3–1)
ABSOLUTE NEUTROPHIL COUNT (OHS): 3.35 K/UL (ref 1.8–7.7)
ACID FAST MOD KINY STN SPEC: NORMAL
ACID FAST MOD KINY STN SPEC: NORMAL
ALBUMIN SERPL BCP-MCNC: 2.7 G/DL (ref 3.5–5.2)
ALP SERPL-CCNC: 94 UNIT/L (ref 40–150)
ALT SERPL W/O P-5'-P-CCNC: 17 UNIT/L (ref 10–44)
ANION GAP (OHS): 8 MMOL/L (ref 8–16)
AST SERPL-CCNC: 12 UNIT/L (ref 11–45)
BASOPHILS # BLD AUTO: 0.06 K/UL
BASOPHILS NFR BLD AUTO: 1.2 %
BILIRUB SERPL-MCNC: 0.3 MG/DL (ref 0.1–1)
BUN SERPL-MCNC: 12 MG/DL (ref 8–23)
CALCIUM SERPL-MCNC: 8.5 MG/DL (ref 8.7–10.5)
CHLORIDE SERPL-SCNC: 109 MMOL/L (ref 95–110)
CO2 SERPL-SCNC: 24 MMOL/L (ref 23–29)
CREAT SERPL-MCNC: 0.9 MG/DL (ref 0.5–1.4)
ERYTHROCYTE [DISTWIDTH] IN BLOOD BY AUTOMATED COUNT: 13.1 % (ref 11.5–14.5)
GFR SERPLBLD CREATININE-BSD FMLA CKD-EPI: >60 ML/MIN/1.73/M2
GLUCOSE SERPL-MCNC: 99 MG/DL (ref 70–110)
HCT VFR BLD AUTO: 36.3 % (ref 40–54)
HGB BLD-MCNC: 11.8 GM/DL (ref 14–18)
IMM GRANULOCYTES # BLD AUTO: 0.01 K/UL (ref 0–0.04)
IMM GRANULOCYTES NFR BLD AUTO: 0.2 % (ref 0–0.5)
LYMPHOCYTES # BLD AUTO: 1.02 K/UL (ref 1–4.8)
MAGNESIUM SERPL-MCNC: 2.1 MG/DL (ref 1.6–2.6)
MCH RBC QN AUTO: 28.9 PG (ref 27–31)
MCHC RBC AUTO-ENTMCNC: 32.5 G/DL (ref 32–36)
MCV RBC AUTO: 89 FL (ref 82–98)
NUCLEATED RBC (/100WBC) (OHS): 0 /100 WBC
PHOSPHATE SERPL-MCNC: 2.8 MG/DL (ref 2.7–4.5)
PLATELET # BLD AUTO: 332 K/UL (ref 150–450)
PMV BLD AUTO: 9 FL (ref 9.2–12.9)
POTASSIUM SERPL-SCNC: 4.2 MMOL/L (ref 3.5–5.1)
PROT SERPL-MCNC: 5.6 GM/DL (ref 6–8.4)
RBC # BLD AUTO: 4.09 M/UL (ref 4.6–6.2)
RELATIVE EOSINOPHIL (OHS): 4.1 %
RELATIVE LYMPHOCYTE (OHS): 19.7 % (ref 18–48)
RELATIVE MONOCYTE (OHS): 10.2 % (ref 4–15)
RELATIVE NEUTROPHIL (OHS): 64.6 % (ref 38–73)
SODIUM SERPL-SCNC: 141 MMOL/L (ref 136–145)
VANCOMYCIN TROUGH SERPL-MCNC: 20.2 UG/ML (ref 10–22)
WBC # BLD AUTO: 5.18 K/UL (ref 3.9–12.7)

## 2025-05-05 PROCEDURE — 84100 ASSAY OF PHOSPHORUS: CPT | Performed by: ORTHOPAEDIC SURGERY

## 2025-05-05 PROCEDURE — 99024 POSTOP FOLLOW-UP VISIT: CPT | Mod: POP,,, | Performed by: PHYSICIAN ASSISTANT

## 2025-05-05 PROCEDURE — 25000003 PHARM REV CODE 250: Performed by: ORTHOPAEDIC SURGERY

## 2025-05-05 PROCEDURE — 63600175 PHARM REV CODE 636 W HCPCS: Performed by: FAMILY MEDICINE

## 2025-05-05 PROCEDURE — 63600175 PHARM REV CODE 636 W HCPCS: Performed by: STUDENT IN AN ORGANIZED HEALTH CARE EDUCATION/TRAINING PROGRAM

## 2025-05-05 PROCEDURE — 94660 CPAP INITIATION&MGMT: CPT

## 2025-05-05 PROCEDURE — 99900035 HC TECH TIME PER 15 MIN (STAT)

## 2025-05-05 PROCEDURE — 97530 THERAPEUTIC ACTIVITIES: CPT

## 2025-05-05 PROCEDURE — 80202 ASSAY OF VANCOMYCIN: CPT | Performed by: STUDENT IN AN ORGANIZED HEALTH CARE EDUCATION/TRAINING PROGRAM

## 2025-05-05 PROCEDURE — 99223 1ST HOSP IP/OBS HIGH 75: CPT | Mod: NSCH,,, | Performed by: INTERNAL MEDICINE

## 2025-05-05 PROCEDURE — 80053 COMPREHEN METABOLIC PANEL: CPT | Performed by: STUDENT IN AN ORGANIZED HEALTH CARE EDUCATION/TRAINING PROGRAM

## 2025-05-05 PROCEDURE — 36415 COLL VENOUS BLD VENIPUNCTURE: CPT | Performed by: STUDENT IN AN ORGANIZED HEALTH CARE EDUCATION/TRAINING PROGRAM

## 2025-05-05 PROCEDURE — 97116 GAIT TRAINING THERAPY: CPT

## 2025-05-05 PROCEDURE — 63600175 PHARM REV CODE 636 W HCPCS: Performed by: ORTHOPAEDIC SURGERY

## 2025-05-05 PROCEDURE — 25000003 PHARM REV CODE 250: Performed by: STUDENT IN AN ORGANIZED HEALTH CARE EDUCATION/TRAINING PROGRAM

## 2025-05-05 PROCEDURE — 25000003 PHARM REV CODE 250: Performed by: FAMILY MEDICINE

## 2025-05-05 PROCEDURE — 85025 COMPLETE CBC W/AUTO DIFF WBC: CPT | Performed by: ORTHOPAEDIC SURGERY

## 2025-05-05 PROCEDURE — 83735 ASSAY OF MAGNESIUM: CPT | Performed by: ORTHOPAEDIC SURGERY

## 2025-05-05 PROCEDURE — 21400001 HC TELEMETRY ROOM

## 2025-05-05 RX ORDER — CEFEPIME HYDROCHLORIDE 2 G/1
2 INJECTION, POWDER, FOR SOLUTION INTRAVENOUS
Status: DISCONTINUED | OUTPATIENT
Start: 2025-05-05 | End: 2025-05-06 | Stop reason: HOSPADM

## 2025-05-05 RX ADMIN — VANCOMYCIN HYDROCHLORIDE 1000 MG: 1 INJECTION, POWDER, LYOPHILIZED, FOR SOLUTION INTRAVENOUS at 08:05

## 2025-05-05 RX ADMIN — TAMSULOSIN HYDROCHLORIDE 0.4 MG: 0.4 CAPSULE ORAL at 11:05

## 2025-05-05 RX ADMIN — CEFEPIME 2 G: 2 INJECTION, POWDER, FOR SOLUTION INTRAVENOUS at 11:05

## 2025-05-05 RX ADMIN — CEFEPIME 2 G: 2 INJECTION, POWDER, FOR SOLUTION INTRAVENOUS at 08:05

## 2025-05-05 RX ADMIN — CEFEPIME 1 G: 1 INJECTION, POWDER, FOR SOLUTION INTRAMUSCULAR; INTRAVENOUS at 03:05

## 2025-05-05 RX ADMIN — CHLORHEXIDINE GLUCONATE 0.12% ORAL RINSE 10 ML: 1.2 LIQUID ORAL at 08:05

## 2025-05-05 RX ADMIN — CHLORHEXIDINE GLUCONATE 0.12% ORAL RINSE 10 ML: 1.2 LIQUID ORAL at 11:05

## 2025-05-05 RX ADMIN — POLYETHYLENE GLYCOL 3350 17 G: 17 POWDER, FOR SOLUTION ORAL at 11:05

## 2025-05-05 RX ADMIN — VANCOMYCIN HYDROCHLORIDE 1250 MG: 1.25 INJECTION, POWDER, LYOPHILIZED, FOR SOLUTION INTRAVENOUS at 06:05
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
## 2025-05-05 NOTE — ASSESSMENT & PLAN NOTE
Previous history of hypothyroid, treated with levothyroxine.  Patient reports no longer taking this medication.    --TSH and free T4 within normal limits

## 2025-05-05 NOTE — PT/OT/SLP PROGRESS
Physical Therapy Treatment and Discharge note    Patient Name:  Eloy Jean   MRN:  [id number]    Recommendations:     Discharge Recommendations: No Therapy Indicated  Discharge Equipment Recommendations: none  Barriers to discharge: None    Assessment:     PT IS NO LONGER A CANDIDATE FOR INPATIENT SKILLED P.T. DUE TO INDEP LEVEL OF FUNCTION    Plan:     D/C P.T. SERVICES    Subjective     Chief Complaint: NONE, AGREEABLE TO PARTICIPATE  Patient/Family Comments/goals:   Pain/Comfort:  Pain Rating 1: 3/10  Location - Side 1: Left  Location - Orientation 1: generalized  Location 1: knee  Pain Addressed 1: Reposition      Objective:     Communicated with [facility] prior to session.  Patient found HOB elevated with peripheral IV upon PT entry to room.     General Precautions: Standard, fall  Orthopedic Precautions: LLE weight bearing as tolerated  Braces: N/A  Respiratory Status: Room air     Functional Mobility:  Bed Mobility:     Rolling Left:  independence  Rolling Right: independence  Scooting: independence  Bridging: independence  Supine to Sit: independence  Sit to Supine: independence  Transfers:     Sit to Stand:  independence with no AD  Gait: PT ' NO AD INDEP, NO LOB OR SOB ON ROOM AIR, STEP THROUGH GAIT PATTERN, NO LIMP  Balance: GOOD SITTING AND STANDING BALANCE DURING GAIT  ICE PACK APPLIED TO L KNEE, PT ENCOURAGED TO USE THROUGHOUT THE DAY, AVOID HEAT    AM-PAC 6 CLICK MOBILITY  Turning over in bed (including adjusting bedclothes, sheets and blankets)?: 4  Sitting down on and standing up from a chair with arms (e.g., wheelchair, bedside commode, etc.): 4  Moving from lying on back to sitting on the side of the bed?: 4  Moving to and from a bed to a chair (including a wheelchair)?: 4  Need to walk in hospital room?: 4  Climbing 3-5 steps with a railing?: 1  Basic Mobility Total Score: 21     Treatment & Education:  PT EDUCATED IN ROLE OF P.T., EDUCATED IN WBAT FOR LLE, EDUCATED IN LLE THEREX TO PERFORM  WHILE SEATED OR SUPINE: HIP FLEX/EXT, LAQ, QUAD SET, HEEL SLIDE, AP.  PT ENCOURAGED TO UTILIZE ICE TO SURGICAL SITE THROUGHOUT THE DAY TO TOLERANCE.  PT ENCOURAGED TO AVOID PUTTING PILLOW UNDER SURGICAL KNEE SINCE KNEE EXTENSION IS ENCOURAGED.  PT ENCOURAGED TO AMBULATE AND PERFORM THEREX HOURLY DURING AWAKE DAYLIGHT HOURS TO TOLERANCE.  PT UNDERSTANDS AND AGREE, ALL QUESTIONS ANSWERED, ALL NEEDS MET    Patient left HOB elevated with all lines intact and call button in reach..    GOALS:   Multidisciplinary Problems       Physical Therapy Goals       Not on file              Multidisciplinary Problems (Resolved)          Problem: Physical Therapy    Goal Priority Disciplines Outcome Interventions   Physical Therapy Goal   (Resolved)     PT, PT/OT Met    Description: Following goals will be met in 14 days  1. Improve knee ROM to WFL  2. Improve SL balance to 10  3 IND with stairs                       DME Justifications:  No DME recommended requiring DME justifications    Time Tracking:     PT Received On: 05/05/25  PT Start Time: 1030     PT Stop Time: 1055  PT Total Time (min): 25 min     Billable Minutes: Gait Training 10 and Therapeutic Activity 15    Treatment Type: Treatment  PT/PTA: PT     Number of PTA visits since last PT visit: 0     05/05/2025

## 2025-05-05 NOTE — PLAN OF CARE
Plan of care reviewed with patient with verbal understanding. Chart and orders reviewed.  Pt remains free from falls this shift, Safety precautions in place.   Pt currently resting comfortably in bed.   Purposeful rounding with bed in lowest position, side rails up, call light in reach.  Will continue to monitor until end of shift.       Problem: Adult Inpatient Plan of Care  Goal: Plan of Care Review  Outcome: Progressing  Flowsheets (Taken 5/5/2025 0137)  Plan of Care Reviewed With: patient  Goal: Patient-Specific Goal (Individualized)  Outcome: Progressing  Flowsheets (Taken 5/5/2025 0137)  Individualized Care Needs: Pt request cluster care to aide in resting process.  Anxieties, Fears or Concerns: none voiced  Patient/Family-Specific Goals (Include Timeframe): Pt will be hemodynamically and show signs of reduced infection by end of admission.     Problem: Wound  Goal: Optimal Coping  Outcome: Progressing     Problem: Skin or Soft Tissue Infection  Goal: Absence of Infection Signs and Symptoms  Outcome: Progressing

## 2025-05-05 NOTE — PROGRESS NOTES
Pharmacist Renal Dose Adjustment Note    Eloy Jean is a 65 y.o. male being treated with the medication Cefepime    Patient Data:    Vital Signs (Most Recent):  Temp: 97.9 °F (36.6 °C) (05/05/25 0335)  Pulse: 67 (05/05/25 0335)  Resp: 20 (05/05/25 0335)  BP: 128/62 (05/05/25 0335)  SpO2: 95 % (05/05/25 0335) Vital Signs (72h Range):  Temp:  [97.8 °F (36.6 °C)-99 °F (37.2 °C)]   Pulse:  []   Resp:  [11-20]   BP: (113-153)/(58-82)   SpO2:  [94 %-100 %]      Recent Labs   Lab 05/03/25  1054 05/04/25  0451 05/05/25  0442   CREATININE 1.1 0.9 0.9     Serum creatinine: 0.9 mg/dL 05/05/25 0442  Estimated creatinine clearance: 92.6 mL/min    Medication:Cefepime dose: 1g frequency q6 will be changed to medication:Cefepime dose:2g frequency:q8    Pharmacist's Name: Michell Barrios  Pharmacist's Extension: [phone]
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
## 2025-05-05 NOTE — PROGRESS NOTES
[facility])  Orthopedics  Progress Note    Patient Name: Eloy Jean  MRN: [id number]  Admission Date: 5/3/2025  Hospital Length of Stay: 2 days  Attending Provider: Isabel Benton MD  Primary Care Provider: Madeline, Primary Doctor  Follow-up For: Procedure(s) (LRB):  IRRIGATION AND DEBRIDEMENT, LOWER EXTREMITY (Left)  ASPIRATION, ABSCESS (Left)    Post-Operative Day: 2 Day Post-Op  Subjective:     Principal Problem:Prepatellar bursitis of left knee    Principal Orthopedic Problem:  Left knee septic prepatellar bursitis    Interval History: Eloy Jean is a 65 y.o. male postop day 2 status post irrigation and debridement with excisional debridement of left prepatellar abscess and aspiration of left knee joint.  Patient is resting comfortably in bed.  He states that his pain is not significantly changed when compared to preop.  Denies numbness or tingling in the extremity.    Review of patient's allergies indicates:   Allergen Reactions    Clindamycin Rash       Current Facility-Administered Medications   Medication    acetaminophen tablet 650 mg    aluminum-magnesium hydroxide-simethicone 200-200-20 mg/5 mL suspension 30 mL    ceFEPIme injection 2 g    chlorhexidine 0.12 % solution 10 mL    dextrose 50% injection 12.5 g    dextrose 50% injection 12.5 g    dextrose 50% injection 25 g    diazePAM tablet 5 mg    diphenhydrAMINE injection 25 mg    glucagon (human recombinant) injection 1 mg    glucose chewable tablet 16 g    glucose chewable tablet 24 g    hydrALAZINE injection 10 mg    HYDROmorphone (PF) injection 0.2 mg    naloxone 0.4 mg/mL injection 0.02 mg    ondansetron injection 4 mg    ondansetron injection 4 mg    ondansetron injection 4 mg    oxyCODONE-acetaminophen 5-325 mg per tablet 1 tablet    polyethylene glycol packet 17 g    prochlorperazine injection Soln 5 mg    sodium chloride 0.9% flush 10 mL    sodium chloride 0.9% flush 10 mL    tamsulosin 24 hr capsule 0.4 mg    vancomycin -  "pharmacy to dose    vancomycin 1,250 mg in 0.9% NaCl 250 mL IVPB (admixture device)     Objective:     Vital Signs (Most Recent):  Temp: 97.8 °F (36.6 °C) (05/05/25 0738)  Pulse: 68 (05/05/25 0807)  Resp: 18 (05/05/25 0738)  BP: 130/69 (05/05/25 0738)  SpO2: 96 % (05/05/25 0738) Vital Signs (24h Range):  Temp:  [97.8 °F (36.6 °C)-98.8 °F (37.1 °C)] 97.8 °F (36.6 °C)  Pulse:  [62-94] 68  Resp:  [18-20] 18  SpO2:  [94 %-96 %] 96 %  BP: (113-134)/(59-71) 130/69     Weight: 80 kg (176 lb 5.9 oz)  Height: 6' 2" (188 cm)  Body mass index is 22.64 kg/m².      Intake/Output Summary (Last 24 hours) at 5/5/2025 0918  Last data filed at 5/5/2025 0700  Gross per 24 hour   Intake 727.39 ml   Output 1100 ml   Net -372.61 ml       Ortho/SPM Exam  Left lower extremity:   Dressings are clean, dry, and intact   Moderate edema   Moderate TTP  ROM decreased secondary to pain and swelling   Calf and compartments are soft and compressible   Motor exam normal   Sensation and pulses intact   Cap refill brisk    GEN: Well developed, well nourished male. AAOX3. No acute distress.   Head: Normocephalic, atraumatic.   Eyes: THOMAS  Neck: Trachea is midline, no adenopathy  Resp: Breathing unlabored.  Neuro: Motor function normal, Cranial nerves intact  Psych: Mood and affect appropriate.      Significant Labs:   Recent Lab Results         05/05/25  0442        Albumin 2.7       ALP 94       ALT 17       Anion Gap 8       AST 12       Baso # 0.06       Basophil % 1.2       BILIRUBIN TOTAL 0.3  Comment: For infants and newborns, interpretation of results should be based   on gestational age, weight and in agreement with clinical   observations.    Premature Infant recommended reference ranges:   0-24 hours:  <8.0 mg/dL   24-48 hours: <12.0 mg/dL   3-5 days:    <15.0 mg/dL   6-29 days:   <15.0 mg/dL       BUN 12       Calcium 8.5       Chloride 109       CO2 24       Creatinine 0.9       eGFR >60  Comment: Estimated GFR calculated using the CKD-EPI " creatinine (2021) equation.       Eos # 0.21       Eos % 4.1       Glucose 99       Gran # (ANC) 3.35       Hematocrit 36.3       Hemoglobin 11.8       Immature Grans (Abs) 0.01  Comment: Mild elevation in immature granulocytes is non specific and can be seen in a variety of conditions including stress response, acute inflammation, trauma and pregnancy. Correlation with other laboratory and clinical findings is essential.       Immature Granulocytes 0.2       Lymph # 1.02       Lymph % 19.7       Magnesium  2.1       MCH 28.9       MCHC 32.5       MCV 89       Mono # 0.53       Mono % 10.2       MPV 9.0       Neut % 64.6       nRBC 0       Phosphorus Level 2.8       Platelet Count 332       Potassium 4.2       PROTEIN TOTAL 5.6       RBC 4.09       RDW 13.1       Sodium 141       WBC 5.18             All pertinent labs within the past 24 hours have been reviewed.    Significant Imaging: I have reviewed and interpreted all pertinent imaging results/findings.    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 male postop day 2 status post irrigation and debridement with excisional debridement of left prepatellar abscess and aspiration of left knee joint    Plan:  Weightbearing as tolerated to left lower extremity   PT/OT for gait training and ADLs   ROM as tolerated to the left knee   DVT prophylaxis per primary team   IV antibiotics per primary team, intraoperative cultures are pending, appreciate Infectious Disease input   Patient is ready for discharge from orthopedic standpoint once  discharge antibiotics have been arranged   We will see the patient back in Ortho Trauma Clinic at 2 weeks postop    Balaji Murillo PA-C  Orthopedics  [facility] Tele[facility] ([facility])

## 2025-05-06 VITALS
BODY MASS INDEX: 22.52 KG/M2 | RESPIRATION RATE: 18 BRPM | SYSTOLIC BLOOD PRESSURE: 157 MMHG | WEIGHT: 175.5 LBS | OXYGEN SATURATION: 96 % | HEART RATE: 97 BPM | TEMPERATURE: 98 F | HEIGHT: 74 IN | DIASTOLIC BLOOD PRESSURE: 80 MMHG

## 2025-05-06 PROBLEM — M70.52 PATELLAR BURSITIS OF LEFT KNEE: Status: ACTIVE | Noted: 2025-05-06

## 2025-05-06 LAB
ABSOLUTE EOSINOPHIL (OHS): 0.23 K/UL
ABSOLUTE MONOCYTE (OHS): 0.5 K/UL (ref 0.3–1)
ABSOLUTE NEUTROPHIL COUNT (OHS): 3.21 K/UL (ref 1.8–7.7)
ALBUMIN SERPL BCP-MCNC: 2.7 G/DL (ref 3.5–5.2)
ALP SERPL-CCNC: 107 UNIT/L (ref 40–150)
ALT SERPL W/O P-5'-P-CCNC: 13 UNIT/L (ref 10–44)
ANION GAP (OHS): 7 MMOL/L (ref 8–16)
AST SERPL-CCNC: 12 UNIT/L (ref 11–45)
BASOPHILS # BLD AUTO: 0.08 K/UL
BASOPHILS NFR BLD AUTO: 1.5 %
BILIRUB SERPL-MCNC: 0.2 MG/DL (ref 0.1–1)
BUN SERPL-MCNC: 19 MG/DL (ref 8–23)
CALCIUM SERPL-MCNC: 8.4 MG/DL (ref 8.7–10.5)
CHLORIDE SERPL-SCNC: 110 MMOL/L (ref 95–110)
CO2 SERPL-SCNC: 23 MMOL/L (ref 23–29)
CREAT SERPL-MCNC: 1 MG/DL (ref 0.5–1.4)
ERYTHROCYTE [DISTWIDTH] IN BLOOD BY AUTOMATED COUNT: 13 % (ref 11.5–14.5)
FUNGUS SPEC CULT: NORMAL
FUNGUS SPEC CULT: NORMAL
GFR SERPLBLD CREATININE-BSD FMLA CKD-EPI: >60 ML/MIN/1.73/M2
GLUCOSE SERPL-MCNC: 110 MG/DL (ref 70–110)
HCT VFR BLD AUTO: 38 % (ref 40–54)
HGB BLD-MCNC: 12.2 GM/DL (ref 14–18)
IMM GRANULOCYTES # BLD AUTO: 0.02 K/UL (ref 0–0.04)
IMM GRANULOCYTES NFR BLD AUTO: 0.4 % (ref 0–0.5)
LYMPHOCYTES # BLD AUTO: 1.41 K/UL (ref 1–4.8)
MAGNESIUM SERPL-MCNC: 2.1 MG/DL (ref 1.6–2.6)
MCH RBC QN AUTO: 28.6 PG (ref 27–31)
MCHC RBC AUTO-ENTMCNC: 32.1 G/DL (ref 32–36)
MCV RBC AUTO: 89 FL (ref 82–98)
NUCLEATED RBC (/100WBC) (OHS): 0 /100 WBC
PHOSPHATE SERPL-MCNC: 3.1 MG/DL (ref 2.7–4.5)
PLATELET # BLD AUTO: 391 K/UL (ref 150–450)
PMV BLD AUTO: 8.8 FL (ref 9.2–12.9)
POTASSIUM SERPL-SCNC: 4.1 MMOL/L (ref 3.5–5.1)
PROT SERPL-MCNC: 5.5 GM/DL (ref 6–8.4)
RBC # BLD AUTO: 4.27 M/UL (ref 4.6–6.2)
RELATIVE EOSINOPHIL (OHS): 4.2 %
RELATIVE LYMPHOCYTE (OHS): 25.9 % (ref 18–48)
RELATIVE MONOCYTE (OHS): 9.2 % (ref 4–15)
RELATIVE NEUTROPHIL (OHS): 58.8 % (ref 38–73)
SODIUM SERPL-SCNC: 140 MMOL/L (ref 136–145)
WBC # BLD AUTO: 5.45 K/UL (ref 3.9–12.7)

## 2025-05-06 PROCEDURE — 84100 ASSAY OF PHOSPHORUS: CPT | Performed by: ORTHOPAEDIC SURGERY

## 2025-05-06 PROCEDURE — 80053 COMPREHEN METABOLIC PANEL: CPT | Performed by: STUDENT IN AN ORGANIZED HEALTH CARE EDUCATION/TRAINING PROGRAM

## 2025-05-06 PROCEDURE — 36415 COLL VENOUS BLD VENIPUNCTURE: CPT | Performed by: ORTHOPAEDIC SURGERY

## 2025-05-06 PROCEDURE — 85025 COMPLETE CBC W/AUTO DIFF WBC: CPT | Performed by: ORTHOPAEDIC SURGERY

## 2025-05-06 PROCEDURE — 99024 POSTOP FOLLOW-UP VISIT: CPT | Mod: POP,,, | Performed by: PHYSICIAN ASSISTANT

## 2025-05-06 PROCEDURE — 25000003 PHARM REV CODE 250: Performed by: ORTHOPAEDIC SURGERY

## 2025-05-06 PROCEDURE — 99900035 HC TECH TIME PER 15 MIN (STAT)

## 2025-05-06 PROCEDURE — 83735 ASSAY OF MAGNESIUM: CPT | Performed by: ORTHOPAEDIC SURGERY

## 2025-05-06 PROCEDURE — 25000003 PHARM REV CODE 250: Performed by: FAMILY MEDICINE

## 2025-05-06 PROCEDURE — 63600175 PHARM REV CODE 636 W HCPCS: Performed by: FAMILY MEDICINE

## 2025-05-06 RX ORDER — LINEZOLID 600 MG/1
600 TABLET, FILM COATED ORAL EVERY 12 HOURS
Qty: 24 TABLET | Refills: 0 | Status: SHIPPED | OUTPATIENT
Start: 2025-05-06 | End: 2025-05-18

## 2025-05-06 RX ORDER — OXYCODONE AND ACETAMINOPHEN 5; 325 MG/1; MG/1
1 TABLET ORAL EVERY 6 HOURS PRN
Qty: 15 TABLET | Refills: 0 | Status: SHIPPED | OUTPATIENT
Start: 2025-05-06 | End: 2025-05-11

## 2025-05-06 RX ADMIN — VANCOMYCIN HYDROCHLORIDE 1000 MG: 1 INJECTION, POWDER, LYOPHILIZED, FOR SOLUTION INTRAVENOUS at 09:05

## 2025-05-06 RX ADMIN — CEFEPIME 2 G: 2 INJECTION, POWDER, FOR SOLUTION INTRAVENOUS at 04:05

## 2025-05-06 RX ADMIN — POLYETHYLENE GLYCOL 3350 17 G: 17 POWDER, FOR SOLUTION ORAL at 09:05

## 2025-05-06 RX ADMIN — CHLORHEXIDINE GLUCONATE 0.12% ORAL RINSE 10 ML: 1.2 LIQUID ORAL at 09:05

## 2025-05-06 RX ADMIN — TAMSULOSIN HYDROCHLORIDE 0.4 MG: 0.4 CAPSULE ORAL at 09:05

## 2025-05-06 RX ADMIN — CEFEPIME 2 G: 2 INJECTION, POWDER, FOR SOLUTION INTRAVENOUS at 12:05

## 2025-05-06 NOTE — PLAN OF CARE
[facility] - Telemetry (Hospital)  Discharge Final Note    Primary Care Provider: No, Primary Doctor    Expected Discharge Date: 5/6/2025    Final Discharge Note (most recent)       Final Note - 05/04/25 0942          Final Note    Assessment Type Discharge Planning Assessment        Post-Acute Status    Coverage [facility]                     Pt discharging home with no needs. Please follow-up with PCP. Follow up with Infectious diseases hospitalist in 1 week.              Contact Info       [facility]    [address] [place] [address] Dr Browne [address] [place] [address]   Phone: [phone]       Next Steps: Follow up in 2 week(s)    Luis Carlos Vu MD, [facility]   Specialty: Infectious Diseases, Hospitalist    [address] [place] [address]   Phone: [phone]       Next Steps: Follow up in 1 week(s)

## 2025-05-06 NOTE — PLAN OF CARE
Problem: Adult Inpatient Plan of Care  Goal: Plan of Care Review  Outcome: Progressing  Goal: Patient-Specific Goal (Individualized)  Outcome: Progressing  Goal: Absence of Hospital-Acquired Illness or Injury  Outcome: Progressing  Goal: Optimal Comfort and Wellbeing  Outcome: Progressing  Goal: Readiness for Transition of Care  Outcome: Progressing     Problem: Wound  Goal: Optimal Coping  Outcome: Progressing  Goal: Optimal Functional Ability  Outcome: Progressing  Goal: Absence of Infection Signs and Symptoms  Outcome: Progressing  Goal: Improved Oral Intake  Outcome: Progressing  Goal: Optimal Pain Control and Function  Outcome: Progressing  Goal: Skin Health and Integrity  Outcome: Progressing  Goal: Optimal Wound Healing  Outcome: Progressing     Problem: Infection  Goal: Absence of Infection Signs and Symptoms  Outcome: Progressing     Problem: Skin or Soft Tissue Infection  Goal: Absence of Infection Signs and Symptoms  Outcome: Progressing       A206/A206 ANDRAE Jean is a 65 y.o.male admitted on 5/3/2025 for Arthritis, septic, knee   Code Status: Full Code MRN: [id number]   Review of patient's allergies indicates:   Allergen Reactions    Clindamycin Rash     Past Medical History:   Diagnosis Date    Cancer     bladder cancer      PRN meds    acetaminophen, 650 mg, Q4H PRN  aluminum-magnesium hydroxide-simethicone, 30 mL, QID PRN  dextrose 50%, 12.5 g, PRN  dextrose 50%, 12.5 g, PRN  dextrose 50%, 25 g, PRN  diazePAM, 5 mg, BID PRN  diphenhydrAMINE, 25 mg, Q6H PRN  glucagon (human recombinant), 1 mg, PRN  glucose, 16 g, PRN  glucose, 24 g, PRN  HYDROmorphone, 0.2 mg, Q5 Min PRN  naloxone, 0.02 mg, PRN  ondansetron, 4 mg, Q8H PRN  ondansetron, 4 mg, Daily PRN  ondansetron, 4 mg, Daily PRN  oxyCODONE-acetaminophen, 1 tablet, Q4H PRN  prochlorperazine, 5 mg, Q6H PRN  sodium chloride 0.9%, 10 mL, Q12H PRN  sodium chloride 0.9%, 10 mL, PRN  vancomycin - pharmacy to dose, , pharmacy to manage frequency          Pt oriented x4.  VSS.  Pt remained afebrile throughout this shift.   All meds administered per order.   Pt remained free of falls this shift.   Plan of care reviewed. Patient verbalizes understanding.   Pt moving/turning independently weight bearing as tolerated. Pain controlled; surgical dressing clean dry and intact.  Bed low, side rails up x 2, wheels locked, call light in reach.   Patient instructed to call for assistance.  Patient education provided; plan of care reviewed. Patient verbalizes understanding.       Chart check completed. Will continue plan of care.      Orientation: oriented x 4  Buckhorn Coma Scale Score: 15     Lead Monitored: Lead II Rhythm: normal sinus rhythm    Cardiac/Telemetry Box Number: [id number]  VTE Core Measure: Patient refused interventions Last Bowel Movement: 05/01/25  Diet Adult Regular     Karan Score: 20  Fall Risk Score: 15  Accucheck []   Freq?      Lines/Drains/Airways       Peripheral Intravenous Line  Duration                  Peripheral IV - Single Lumen 05/05/25 1845 22 G Anterior;Proximal;Right Forearm <1 day
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
## 2025-05-06 NOTE — SUBJECTIVE & OBJECTIVE
"Past Medical History:   Diagnosis Date    Cancer     bladder cancer       Past Surgical History:   Procedure Laterality Date    ASPIRATION OF ABSCESS Left 5/3/2025    Procedure: ASPIRATION, ABSCESS;  Surgeon: Wilton Graff MD;  Location: [facility] OR;  Service: Orthopedics;  Laterality: Left;    BACK SURGERY      CYSTOSCOPY      Yearly to monitor bladder cancer    FRACTURE SURGERY      previous hip fx    IRRIGATION AND DEBRIDEMENT OF LOWER EXTREMITY Left 5/3/2025    Procedure: IRRIGATION AND DEBRIDEMENT, LOWER EXTREMITY;  Surgeon: Wilton Graff MD;  Location: [facility] OR;  Service: Orthopedics;  Laterality: Left;  left pre-patella       Review of patient's allergies indicates:   Allergen Reactions    Clindamycin Rash       Medications:  Medications Prior to Admission   Medication Sig    dextroamphetamine-amphetamine (ADDERALL) 20 mg tablet TK 1 T PO TID    diazePAM (VALIUM) 10 MG Tab Take 5-10 mg by mouth nightly as needed.    doxycycline (VIBRAMYCIN) 100 MG Cap Take 1 capsule (100 mg total) by mouth 2 (two) times daily. for 10 days    lithium (LITHOTAB) 300 mg tablet Take 600 mg by mouth 2 (two) times daily.    oxyCODONE-acetaminophen (PERCOCET) 5-325 mg per tablet Take 1 tablet by mouth every 6 (six) hours as needed for Pain.    sulfamethoxazole-trimethoprim 800-160mg (BACTRIM DS) 800-160 mg Tab Take 1 tablet by mouth 2 (two) times daily. for 7 days    tamsulosin (FLOMAX) 0.4 mg Cap      Antibiotics (From admission, onward)      Start     Stop Route Frequency Ordered    05/05/25 2100  vancomycin (VANCOCIN) 1,000 mg in 0.9% NaCl 250 mL IVPB (admixture device)         -- IV Every 12 hours (non-standard times) 05/05/25 1841    05/05/25 1200  ceFEPIme injection 2 g         -- IV Every 8 hours (non-standard times) 05/05/25 0728    05/03/25 1549  vancomycin - pharmacy to dose  (vancomycin IVPB (PEDS and ADULTS))        Placed in "And" Linked Group    -- IV pharmacy to manage frequency 05/03/25 0571      "     Antifungals (From admission, onward)      None          Antivirals (From admission, onward)      None             Immunization History   Administered Date(s) Administered    Tdap 05/01/2025       Family History    None       Social History     Socioeconomic History    Marital status: Single   Tobacco Use    Smoking status: Never    Smokeless tobacco: Never   Substance and Sexual Activity    Alcohol use: Never    Drug use: Never    Sexual activity: Not Currently     Social Drivers of Health     Financial Resource Strain: Patient Declined (5/3/2025)    Overall Financial Resource Strain (CARDIA)     Difficulty of Paying Living Expenses: Patient declined   Food Insecurity: Patient Declined (5/3/2025)    Hunger Vital Sign     Worried About Running Out of Food in the Last Year: Patient declined     Ran Out of Food in the Last Year: Patient declined   Transportation Needs: Patient Declined (5/3/2025)    PRAPARE - Transportation     Lack of Transportation (Medical): Patient declined     Lack of Transportation (Non-Medical): Patient declined   Physical Activity: Insufficiently Active (11/1/2021)    [facility]    Exercise Vital Sign     Days of Exercise per Week: 2 days     Minutes of Exercise per Session: 30 min   Stress: Patient Declined (5/3/2025)    [employer] [place] [employer] - Occupational Stress Questionnaire     Feeling of Stress : Patient declined   Housing Stability: Patient Declined (5/3/2025)    Housing Stability Vital Sign     Unable to Pay for Housing in the Last Year: Patient declined     Homeless in the Last Year: Patient declined     Review of Systems   Constitutional:  Negative for activity change, appetite change, chills, diaphoresis and fatigue.   Respiratory:  Negative for apnea and chest tightness.      Objective:     Vital Signs (Most Recent):  Temp: 98 °F (36.7 °C) (05/06/25 0000)  Pulse: 67  "(05/06/25 0359)  Resp: 20 (05/05/25 2001)  BP: 110/63 (05/06/25 0000)  SpO2: 95 % (05/06/25 0000) Vital Signs (24h Range):  Temp:  [97.7 °F (36.5 °C)-98.4 °F (36.9 °C)] 98 °F (36.7 °C)  Pulse:  [64-83] 67  Resp:  [18-20] 20  SpO2:  [95 %-96 %] 95 %  BP: (108-137)/(56-76) 110/63     Weight: 79.6 kg (175 lb 7.8 oz)  Body mass index is 22.53 kg/m².    Estimated Creatinine Clearance: 92.1 mL/min (based on SCr of 0.9 mg/dL).     Physical Exam  Vitals and nursing note reviewed.   Pulmonary:      Effort: Pulmonary effort is normal.   Abdominal:      General: Abdomen is flat.   Musculoskeletal:         General: Normal range of motion.      Cervical back: Normal range of motion.      Comments: Dressing over left knee   Neurological:      General: No focal deficit present.      Mental Status: He is alert. Mental status is at baseline.          Significant Labs: Blood Culture: No results for input(s): "LABBLOO" in the last 4320 hours.  BMP:   Recent Labs   Lab 05/05/25  0442   GLU 99      K 4.2      CO2 24   BUN 12   CREATININE 0.9   CALCIUM 8.5*   MG 2.1     CBC:   Recent Labs   Lab 05/05/25  0442 05/06/25  0434   WBC 5.18 5.45   HGB 11.8* 12.2*   HCT 36.3* 38.0*    391     CMP:   Recent Labs   Lab 05/05/25  0442      K 4.2      CO2 24   GLU 99   BUN 12   CREATININE 0.9   CALCIUM 8.5*   PROT 5.6*   ALBUMIN 2.7*   BILITOT 0.3   ALKPHOS 94   AST 12   ALT 17   ANIONGAP 8     Wound Culture: No results for input(s): "LABAERO" in the last 4320 hours.  All pertinent labs within the past 24 hours have been reviewed.    Significant Imaging: I have reviewed all pertinent imaging results/findings within the past 24 hours.  "

## 2025-05-06 NOTE — PROGRESS NOTES
Ortho   Status post left pre patella abscess I and D and aspiration of the left knee   Aspiration did not reveal any fluid inside the knee joint very little that came out at looked normal was sent for culture  The pre patella abscess was drained and it was packed.  Patient is on IV antibiotics and doing extremely well   Did tell him that was a packing inside the knee that is why we will feel a little bit weird  Intraop findings the cast with the patient   Dressing taken down  The cellulitis is 80% improved compared to when he came to the hospital   The packing was removed and he has 2 stitches 1 proximal 1 inferior in the wound is open up to 2-1/2 cm in length   New dressing applied without packing the wound 4 x 4, ABD, sterile cast padding and Ace   Discussed with the patient the findings   Recommend he change the dressing 3 days from now I gave him a bag full of dressing and instructed him how to do it with peroxide on 4 x 4 apply it on and then ABD and Ace wrap   Instructed patient to keep the wound dry can not get it wet he can wrap his leg with plastic wrap and put a garbage bag over to prevent it from getting wet   If it gets slightly wet I did tell him he can blow dry the dressing   I would like to see him within a week/next Monday in the office to check on his wound   He is to be discharged with p.o. antibiotics per Infectious Disease    Patient stated the only way of transportation he has is his motorcycle and it is parked outside in he lives very close by so when he will be discharged she can ride his motorcycle home

## 2025-05-06 NOTE — DISCHARGE INSTRUCTIONS
.Our goal at [facility] is to always give you outstanding care and exceptional service. You may receive a survey from [employer] by mail, text or e-mail in the next 24-48 hours asking about the care you received with us. The survey should only take 5-10 minutes to complete and is very important to us.     Your feedback provides us with a way to recognize our staff who work tirelessly to provide the best care! Also, your responses help us learn how to improve when your experience was below our aspiration of excellence. We are always looking for ways to improve your stay. We WILL use your feedback to continue making improvements to help us provide the highest quality care. We keep your personal information and feedback confidential. We appreciate your time completing this survey and can't wait to hear from you!!!    We look forward to your continued care with us! Thanks so much for choosing [facility] for your healthcare needs!

## 2025-05-06 NOTE — ASSESSMENT & PLAN NOTE
Operative note reviewed-  This is a prepatellar bursitis.  We will discuss with ortho to confirm that there was no joint infection  .  Prelim cultures from the prepatellar bursa shows GPC.  We will follow final ID of this organism  We will continue vancomycin and cefepime

## 2025-05-06 NOTE — PLAN OF CARE
Problem: Adult Inpatient Plan of Care  Goal: Plan of Care Review  Outcome: Progressing     Problem: Adult Inpatient Plan of Care  Goal: Readiness for Transition of Care  Outcome: Progressing     Problem: Wound  Goal: Optimal Functional Ability  Outcome: Progressing     Problem: Wound  Goal: Optimal Pain Control and Function  Outcome: Progressing     Problem: Wound  Goal: Optimal Wound Healing  Outcome: Progressing

## 2025-05-06 NOTE — PROGRESS NOTES
Pharmacokinetic Assessment Follow Up: IV Vancomycin    Vancomycin serum concentration assessment(s):    The trough level was drawn correctly and can be used to guide therapy at this time. The measurement is above the desired definitive target range of 15 to 20 mcg/mL.    Vancomycin Regimen Plan:    Change regimen to Vancomycin 1000 mg IV every 12 hours with next serum trough concentration measured at 2000 prior to 3rd dose on 5/6    Drug levels (last 3 results):  Recent Labs   Lab Result Units 05/04/25  0451 05/05/25  1717   Vancomycin Random ug/ml 7.9  --    Vancomycin Trough ug/ml  --  20.2       Pharmacy will continue to follow and monitor vancomycin.    Please contact pharmacy at extension [phone] for questions regarding this assessment.    Thank you for the consult,   Margaux De Leon       Patient brief summary:  Eloy Jean is a 65 y.o. male initiated on antimicrobial therapy with IV Vancomycin for treatment of osteomyelitis/prepatellar brusitis      Drug Allergies:   Review of patient's allergies indicates:   Allergen Reactions    Clindamycin Rash       Actual Body Weight:   80 kg    Renal Function:   Estimated Creatinine Clearance: 92.6 mL/min (based on SCr of 0.9 mg/dL).,     Dialysis Method (if applicable):  N/A    CBC (last 72 hours):  Recent Labs   Lab Result Units 05/03/25  1054 05/04/25  0451 05/05/25  0442   WBC K/uL 8.85 5.96 5.18   HGB gm/dL 12.0* 10.5* 11.8*   HCT % 37.3* 32.4* 36.3*   Platelet Count K/uL 289 248 332   Lymph % % 8.8* 18.8 19.7   Mono % % 9.7 10.9 10.2   Eos % % 0.5 3.2 4.1   Basophil % % 0.5 1.0 1.2       Metabolic Panel (last 72 hours):  Recent Labs   Lab Result Units 05/03/25  1054 05/03/25  1349 05/04/25  0451 05/05/25  0442   Sodium mmol/L 142  --  139 141   Potassium mmol/L 3.5  --  4.2 4.2   Chloride mmol/L 108  --  110 109   CO2 mmol/L 25  --  23 24   Glucose mg/dL 87  --  97 99   Glucose, UA   --  Negative  --   --    BUN mg/dL 19  --  17 12   Creatinine mg/dL 1.1  --  0.9  0.9   Albumin g/dL 3.1*  --  2.6* 2.7*   Bilirubin Total mg/dL 0.3  --  0.3 0.3   ALP unit/L 106  --  81 94   AST unit/L 19  --  12 12   ALT unit/L 17  --  16 17   Magnesium  mg/dL  --   --  2.0 2.1   Phosphorus Level mg/dL  --   --  3.1 2.8       Vancomycin Administrations:  vancomycin given in the last 96 hours                     vancomycin 1,250 mg in 0.9% NaCl 250 mL IVPB (admixture device) (mg) 1,250 mg New Bag 05/05/25 0639     1,250 mg New Bag 05/04/25 1755      Restarted  0755      Restarted  0724     1,250 mg New Bag  0711    vancomycin 1,500 mg in 0.9% NaCl 250 mL IVPB (admixture device) ()  Restarted 05/03/25 1628      Restarted  1557     1,500 mg New Bag  1513                    Microbiologic Results:  Microbiology Results (last 7 days)       Procedure Component Value Units Date/Time    Afb Culture Stain [[id number]] Collected: 05/03/25 1827    Order Status: Completed Specimen: Abscess from Knee, Left Updated: 05/05/25 1459     ACID FAST STAIN  No acid fast bacilli seen    Afb Culture Stain [[id number]] Collected: 05/03/25 1825    Order Status: Completed Specimen: Abscess from Knee, Left Updated: 05/05/25 1459     ACID FAST STAIN  No acid fast bacilli seen    Culture, Anaerobic [[id number]] Collected: 05/03/25 1827    Order Status: Completed Specimen: Abscess from Knee, Left Updated: 05/05/25 0925     Anaerobe Culture Culture In Progress    Blood culture x two cultures. Draw prior to antibiotics. [[id number]]  (Normal) Collected: 05/03/25 1054    Order Status: Completed Specimen: Blood from Peripheral, Antecubital, Left Updated: 05/05/25 0800     Blood Culture No Growth After 24 Hours    Blood culture x two cultures. Draw prior to antibiotics. [[id number]]  (Normal) Collected: 05/03/25 1057    Order Status: Completed Specimen: Blood from Peripheral, Hand, Right Updated: 05/05/25 0800     Blood Culture No Growth After 24 Hours    Aerobic culture [[id number]] Collected: 05/03/25 1825    Order Status:  Completed Specimen: Abscess from Knee, Left Updated: 05/05/25 0722     CULTURE, AEROBIC No Growth To Date    Gram stain [[id number]] Collected: 05/03/25 1825    Order Status: Completed Specimen: Abscess from Knee, Left Updated: 05/04/25 1103     GRAM STAIN Moderate WBC seen     Comment: Corrected result: Previously reported as Rare WBC seen on 5/4/2025 at 1008 CDT.         Many Gram positive cocci     Comment: Corrected result: Previously reported as No organisms seen on 5/4/2025 at 1008 CDT.       Gram stain [[id number]] Collected: 05/03/25 1827    Order Status: Completed Specimen: Abscess from Knee, Left Updated: 05/04/25 1041     GRAM STAIN Few WBC seen      Many Gram positive cocci    Fungus culture [[id number]] Collected: 05/03/25 1825    Order Status: Sent Specimen: Abscess from Knee, Left Updated: 05/03/25 1900    AFB Culture & Smear [[id number]] Collected: 05/03/25 1825    Order Status: Resulted Specimen: Abscess from Knee, Left Updated: 05/03/25 1900    Aerobic culture [[id number]] Collected: 05/03/25 1827    Order Status: Sent Specimen: Abscess from Knee, Left Updated: 05/03/25 1854    AFB Culture & Smear [[id number]] Collected: 05/03/25 1827    Order Status: Resulted Specimen: Abscess from Knee, Left Updated: 05/03/25 1853    Fungus culture [[id number]] Collected: 05/03/25 1827    Order Status: Sent Specimen: Abscess from Knee, Left Updated: 05/03/25 1853    Culture, Anaerobic [[id number]] Collected: 05/03/25 1825    Order Status: Canceled Specimen: Abscess from Knee, Left

## 2025-05-06 NOTE — PLAN OF CARE
Problem: Adult Inpatient Plan of Care  Goal: Plan of Care Review  Outcome: Met  Goal: Patient-Specific Goal (Individualized)  Outcome: Met  Goal: Absence of Hospital-Acquired Illness or Injury  Outcome: Met  Goal: Optimal Comfort and Wellbeing  Outcome: Met  Goal: Readiness for Transition of Care  Outcome: Met     Problem: Wound  Goal: Optimal Coping  Outcome: Met  Goal: Optimal Functional Ability  Outcome: Met  Goal: Absence of Infection Signs and Symptoms  Outcome: Met  Goal: Improved Oral Intake  Outcome: Met  Goal: Optimal Pain Control and Function  Outcome: Met  Goal: Skin Health and Integrity  Outcome: Met  Goal: Optimal Wound Healing  Outcome: Met     Problem: Infection  Goal: Absence of Infection Signs and Symptoms  Outcome: Met     Problem: Skin or Soft Tissue Infection  Goal: Absence of Infection Signs and Symptoms  Outcome: Met

## 2025-05-06 NOTE — PROGRESS NOTES
[facility])  Orthopedics  Progress Note    Patient Name: Eloy Jean  MRN: [id number]  Admission Date: 5/3/2025  Hospital Length of Stay: 3 days  Attending Provider: Isabel Benton MD  Primary Care Provider: Madeline, Primary Doctor  Follow-up For: Procedure(s) (LRB):  IRRIGATION AND DEBRIDEMENT, LOWER EXTREMITY (Left)  ASPIRATION, ABSCESS (Left)    Post-Operative Day: 3 Day Post-Op  Subjective:     Principal Problem:Arthritis, septic, knee    Principal Orthopedic Problem:  Left knee septic prepatellar bursitis    Interval History: Eloy Jean is a 65 y.o. male postop day 3 status post irrigation and debridement with excisional debridement of left prepatellar abscess and aspiration of left knee joint.  Patient is resting comfortably in bed.  He states that his pain is not significantly changed when compared to preop.  Denies numbness or tingling in the extremity.    Review of patient's allergies indicates:   Allergen Reactions    Clindamycin Rash       Current Facility-Administered Medications   Medication    acetaminophen tablet 650 mg    aluminum-magnesium hydroxide-simethicone 200-200-20 mg/5 mL suspension 30 mL    ceFEPIme injection 2 g    chlorhexidine 0.12 % solution 10 mL    dextrose 50% injection 12.5 g    dextrose 50% injection 12.5 g    dextrose 50% injection 25 g    diazePAM tablet 5 mg    diphenhydrAMINE injection 25 mg    glucagon (human recombinant) injection 1 mg    glucose chewable tablet 16 g    glucose chewable tablet 24 g    hydrALAZINE injection 10 mg    HYDROmorphone (PF) injection 0.2 mg    naloxone 0.4 mg/mL injection 0.02 mg    ondansetron injection 4 mg    ondansetron injection 4 mg    ondansetron injection 4 mg    oxyCODONE-acetaminophen 5-325 mg per tablet 1 tablet    polyethylene glycol packet 17 g    prochlorperazine injection Soln 5 mg    sodium chloride 0.9% flush 10 mL    sodium chloride 0.9% flush 10 mL    tamsulosin 24 hr capsule 0.4 mg    vancomycin (VANCOCIN) 1,000 mg  "in 0.9% NaCl 250 mL IVPB (admixture device)    vancomycin - pharmacy to dose     Objective:     Vital Signs (Most Recent):  Temp: 98.3 °F (36.8 °C) (05/06/25 0521)  Pulse: 68 (05/06/25 0806)  Resp: 20 (05/06/25 0521)  BP: 112/70 (05/06/25 0521)  SpO2: 96 % (05/06/25 0521) Vital Signs (24h Range):  Temp:  [97.7 °F (36.5 °C)-98.4 °F (36.9 °C)] 98.3 °F (36.8 °C)  Pulse:  [62-83] 68  Resp:  [18-31] 20  SpO2:  [95 %-96 %] 96 %  BP: (108-137)/(56-76) 112/70     Weight: 79.6 kg (175 lb 7.8 oz)  Height: 6' 2" (188 cm)  Body mass index is 22.53 kg/m².      Intake/Output Summary (Last 24 hours) at 5/6/2025 0929  Last data filed at 5/6/2025 0521  Gross per 24 hour   Intake 744.65 ml   Output 200 ml   Net 544.65 ml       Ortho/SPM Exam  Left lower extremity:   Dressings are clean, dry, and intact   Moderate edema   Moderate TTP  ROM decreased secondary to pain and swelling   Calf and compartments are soft and compressible   Motor exam normal   Sensation and pulses intact   Cap refill brisk    GEN: Well developed, well nourished male. AAOX3. No acute distress.   Head: Normocephalic, atraumatic.   Eyes: THOMAS  Neck: Trachea is midline, no adenopathy  Resp: Breathing unlabored.  Neuro: Motor function normal, Cranial nerves intact  Psych: Mood and affect appropriate.      Significant Labs:   Recent Lab Results         05/06/25  0434   05/05/25  1717        Albumin 2.7                  ALT 13         Anion Gap 7         AST 12         Baso # 0.08         Basophil % 1.5         BILIRUBIN TOTAL 0.2  Comment: For infants and newborns, interpretation of results should be based   on gestational age, weight and in agreement with clinical   observations.    Premature Infant recommended reference ranges:   0-24 hours:  <8.0 mg/dL   24-48 hours: <12.0 mg/dL   3-5 days:    <15.0 mg/dL   6-29 days:   <15.0 mg/dL         BUN 19         Calcium 8.4         Chloride 110         CO2 23         Creatinine 1.0         eGFR >60  Comment: " Estimated GFR calculated using the CKD-EPI creatinine (2021) equation.         Eos # 0.23         Eos % 4.2         Glucose 110         Gran # (ANC) 3.21         Hematocrit 38.0         Hemoglobin 12.2         Immature Grans (Abs) 0.02  Comment: Mild elevation in immature granulocytes is non specific and can be seen in a variety of conditions including stress response, acute inflammation, trauma and pregnancy. Correlation with other laboratory and clinical findings is essential.         Immature Granulocytes 0.4         Lymph # 1.41         Lymph % 25.9         Magnesium  2.1         MCH 28.6         MCHC 32.1         MCV 89         Mono # 0.50         Mono % 9.2         MPV 8.8         Neut % 58.8         nRBC 0         Phosphorus Level 3.1         Platelet Count 391         Potassium 4.1         PROTEIN TOTAL 5.5         RBC 4.27         RDW 13.0         Sodium 140         Vancomycin-Trough   20.2       WBC 5.45               All pertinent labs within the past 24 hours have been reviewed.    Significant Imaging: I have reviewed and interpreted all pertinent imaging results/findings.    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 male postop day 3 status post irrigation and debridement with excisional debridement of left prepatellar abscess and aspiration of left knee joint    Plan:  Weightbearing as tolerated to left lower extremity   PT/OT for gait training and ADLs   ROM as tolerated to the left knee   DVT prophylaxis per primary team   IV antibiotics per primary team, intraoperative  cultures are pending, appreciate Infectious Disease input   Patient is ready for discharge from orthopedic standpoint once discharge antibiotics have been arranged   We will see the patient back in Ortho Trauma Clinic at 2 weeks postop    Balaji Murillo PA-C  Orthopedics  [place] - Telemetry ([facility])

## 2025-05-07 ENCOUNTER — TELEPHONE (OUTPATIENT)
Dept: ORTHOPEDICS | Facility: CLINIC | Age: 65
End: 2025-05-07
Payer: OTHER GOVERNMENT

## 2025-05-07 LAB — BACTERIA SPEC AEROBE CULT: NO GROWTH

## 2025-05-07 NOTE — TELEPHONE ENCOUNTER
Appointment has been scheduled with Dr Graff.      Patient had surgery on 05/03/2025. Please schedule follow up at 2 weeks postop

## 2025-05-08 LAB — BACTERIA SPEC ANAEROBE CULT: NORMAL
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
## 2025-05-09 LAB
BACTERIA BLD CULT: NORMAL
BACTERIA BLD CULT: NORMAL

## 2025-05-10 LAB — BACTERIA SPEC AEROBE CULT: ABNORMAL

## 2025-05-12 ENCOUNTER — OFFICE VISIT (OUTPATIENT)
Dept: ORTHOPEDICS | Facility: CLINIC | Age: 65
End: 2025-05-12
Payer: MEDICARE

## 2025-05-12 VITALS — BODY MASS INDEX: 22.52 KG/M2 | WEIGHT: 175.5 LBS | HEIGHT: 74 IN

## 2025-05-12 DIAGNOSIS — L03.116 CELLULITIS OF LEFT KNEE: ICD-10-CM

## 2025-05-12 DIAGNOSIS — M70.42 PREPATELLAR BURSITIS, LEFT KNEE: Primary | ICD-10-CM

## 2025-05-12 PROCEDURE — 99999 PR PBB SHADOW E&M-EST. PATIENT-LVL III: CPT | Mod: PBBFAC,,, | Performed by: ORTHOPAEDIC SURGERY

## 2025-05-12 PROCEDURE — 99213 OFFICE O/P EST LOW 20 MIN: CPT | Mod: PBBFAC | Performed by: ORTHOPAEDIC SURGERY

## 2025-05-12 PROCEDURE — 99024 POSTOP FOLLOW-UP VISIT: CPT | Mod: POP,,, | Performed by: ORTHOPAEDIC SURGERY

## 2025-05-12 NOTE — PATIENT INSTRUCTIONS
Continue taking your Zyvox  The wound looks extremely well it is closing up nicely   Band-Aid on a daily basis or as needed to be applied and keep clean and dry do not get it wet   You can apply lotion if you want around the skin to make it less itchy  I will see you in 1 week possibly remove her sutures and see how things went
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
## 2025-05-19 ENCOUNTER — OFFICE VISIT (OUTPATIENT)
Dept: ORTHOPEDICS | Facility: CLINIC | Age: 65
End: 2025-05-19
Payer: MEDICARE

## 2025-05-19 VITALS — WEIGHT: 175 LBS | BODY MASS INDEX: 22.46 KG/M2 | HEIGHT: 74 IN

## 2025-05-19 DIAGNOSIS — Z86.14 HISTORY OF MRSA INFECTION: ICD-10-CM

## 2025-05-19 DIAGNOSIS — M70.42 PREPATELLAR BURSITIS, LEFT KNEE: Primary | ICD-10-CM

## 2025-05-19 DIAGNOSIS — L03.116 CELLULITIS OF LEFT KNEE: ICD-10-CM

## 2025-05-19 PROCEDURE — 99213 OFFICE O/P EST LOW 20 MIN: CPT | Mod: PBBFAC | Performed by: ORTHOPAEDIC SURGERY

## 2025-05-19 PROCEDURE — 99999 PR PBB SHADOW E&M-EST. PATIENT-LVL III: CPT | Mod: PBBFAC,,, | Performed by: ORTHOPAEDIC SURGERY

## 2025-05-19 PROCEDURE — 99024 POSTOP FOLLOW-UP VISIT: CPT | Mod: POP,,, | Performed by: ORTHOPAEDIC SURGERY

## 2025-05-19 RX ORDER — LINEZOLID 600 MG/1
600 TABLET, FILM COATED ORAL EVERY 12 HOURS
Qty: 7 TABLET | Refills: 0 | Status: SHIPPED | OUTPATIENT
Start: 2025-05-19 | End: 2025-05-19

## 2025-05-19 RX ORDER — SULFAMETHOXAZOLE AND TRIMETHOPRIM 800; 160 MG/1; MG/1
1 TABLET ORAL 2 TIMES DAILY
Qty: 14 TABLET | Refills: 0 | Status: SHIPPED | OUTPATIENT
Start: 2025-05-19

## 2025-05-19 NOTE — PATIENT INSTRUCTIONS
Your wound has completely closed and there is no drainage   The erythema has completely gone  You were taking Zyvox in his bothering her stomach   We will switch her to Bactrim ds 1 tablet twice a day for 1 more week  You grew MRSA which is methicillin-resistant Staph aureus and you carry that usually in you nose  Some people do Bactroban ointment in the nose they apply 1 dab in each nostril twice a day for a week to help minimize the amount of staph but now that you are on Zyvox you do not need to add the Bactroban ointment   We will switch her to back trimmed ds 1 tablet twice a day  I will see you back in 2 weeks   You can get in the shower in 3 days
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
## 2025-06-27 ENCOUNTER — PATIENT MESSAGE (OUTPATIENT)
Dept: SURGERY | Facility: HOSPITAL | Age: 65
End: 2025-06-27
Payer: MEDICARE

## 2025-07-02 ENCOUNTER — PATIENT MESSAGE (OUTPATIENT)
Dept: SURGERY | Facility: HOSPITAL | Age: 65
End: 2025-07-02
Payer: MEDICARE

## (undated) DEVICE — SWAB CULTURETTE II DUAL

## (undated) DEVICE — GOWN POLY REINF BRTH SLV XL

## (undated) DEVICE — GLOVE SURG PLYSPHRN ORTH SZ7.5

## (undated) DEVICE — PACK BASIC SETUP SC BR

## (undated) DEVICE — GOWN NONREINF SET-IN SLV 2XL

## (undated) DEVICE — PAD CAST SPECIALIST STRL 4

## (undated) DEVICE — SPONGE COTTON TRAY 4X4IN

## (undated) DEVICE — DRAPE U SPLIT SHEET 54X76IN

## (undated) DEVICE — PAD ABDOMINAL STERILE 8X10IN

## (undated) DEVICE — MANIFOLD 4 PORT

## (undated) DEVICE — ELECTRODE REM PLYHSV RETURN 9

## (undated) DEVICE — GLOVE SENSICARE PI GRN 8

## (undated) DEVICE — COVER LIGHT HANDLE 80/CA

## (undated) DEVICE — SUT ETHILON 2-0 PSLX 30IN

## (undated) DEVICE — ALCOHOL 70% ANTISEPTIC ISO 4OZ

## (undated) DEVICE — SOL NORMAL USPCA 0.9%